# Patient Record
Sex: FEMALE | Race: WHITE | Employment: OTHER | URBAN - METROPOLITAN AREA
[De-identification: names, ages, dates, MRNs, and addresses within clinical notes are randomized per-mention and may not be internally consistent; named-entity substitution may affect disease eponyms.]

---

## 2017-05-17 PROBLEM — H43.819 PVD (POSTERIOR VITREOUS DETACHMENT): Status: ACTIVE | Noted: 2017-05-17

## 2017-05-17 PROBLEM — H25.813 COMBINED FORMS OF AGE-RELATED CATARACT OF BOTH EYES: Status: ACTIVE | Noted: 2017-05-17

## 2017-05-17 PROBLEM — G43.109 OCULAR MIGRAINE: Status: ACTIVE | Noted: 2017-05-17

## 2017-05-18 PROCEDURE — 82607 VITAMIN B-12: CPT | Performed by: NURSE PRACTITIONER

## 2017-05-22 RX ORDER — MULTIVITAMIN WITH IRON
250 TABLET ORAL DAILY
COMMUNITY
End: 2018-03-30

## 2017-06-07 ENCOUNTER — HOSPITAL ENCOUNTER (OUTPATIENT)
Facility: HOSPITAL | Age: 72
Setting detail: HOSPITAL OUTPATIENT SURGERY
Discharge: HOME | End: 2017-06-07
Attending: INTERNAL MEDICINE | Admitting: INTERNAL MEDICINE
Payer: MEDICARE

## 2017-06-07 ENCOUNTER — SURGERY (OUTPATIENT)
Age: 72
End: 2017-06-07

## 2017-06-07 VITALS
HEART RATE: 88 BPM | BODY MASS INDEX: 45.96 KG/M2 | SYSTOLIC BLOOD PRESSURE: 112 MMHG | HEIGHT: 66 IN | TEMPERATURE: 98 F | DIASTOLIC BLOOD PRESSURE: 76 MMHG | RESPIRATION RATE: 16 BRPM | WEIGHT: 286 LBS | OXYGEN SATURATION: 93 %

## 2017-06-07 DIAGNOSIS — Z12.11 SPECIAL SCREENING FOR MALIGNANT NEOPLASMS, COLON: ICD-10-CM

## 2017-06-07 PROCEDURE — 99152 MOD SED SAME PHYS/QHP 5/>YRS: CPT

## 2017-06-07 PROCEDURE — 88305 TISSUE EXAM BY PATHOLOGIST: CPT | Performed by: INTERNAL MEDICINE

## 2017-06-07 PROCEDURE — 0DBH8ZX EXCISION OF CECUM, VIA NATURAL OR ARTIFICIAL OPENING ENDOSCOPIC, DIAGNOSTIC: ICD-10-PCS | Performed by: INTERNAL MEDICINE

## 2017-06-07 RX ORDER — SODIUM CHLORIDE, SODIUM LACTATE, POTASSIUM CHLORIDE, CALCIUM CHLORIDE 600; 310; 30; 20 MG/100ML; MG/100ML; MG/100ML; MG/100ML
INJECTION, SOLUTION INTRAVENOUS CONTINUOUS
Status: DISCONTINUED | OUTPATIENT
Start: 2017-06-07 | End: 2017-06-07

## 2017-06-07 RX ORDER — MIDAZOLAM HYDROCHLORIDE 1 MG/ML
INJECTION INTRAMUSCULAR; INTRAVENOUS
Status: DISCONTINUED | OUTPATIENT
Start: 2017-06-07 | End: 2017-06-07

## 2017-06-07 NOTE — BRIEF OP NOTE
Pre-Operative Diagnosis: Special screening for malignant neoplasms, colon [Z12.11]     Post-Operative Diagnosis: diverticulosis, polyp, hemorrhoids     Procedure Performed:   Procedure(s):  colonoscopy with BX    Surgeon(s) and Role:     * Iveth Francis

## 2017-06-07 NOTE — OPERATIVE REPORT
[facility]    PATIENT'S NAME: Marnie Genao   ATTENDING PHYSICIAN: Jaciel Cárdenas M.D. OPERATING PHYSICIAN: Jaciel Cárdenas M.D.    PATIENT ACCOUNT#:   [de-identified]    LOCATION:  [facility] ENDO POOL ROOMS [address] [facility] #:

## 2017-06-07 NOTE — H&P
[facility] [address] [facility] Department of  Gastroenterology  Update Health History :        [facility]  female   Lorenzo Alston MD     [id number]  5/20/1945 Primary Care Physician  Isi Mckinnon MD Comment Procedure: REMOVAL OF ASSESSORY BONE  HALLUX;  Surgeon: Xavier Perez DPM;  Location: [address] [place] [address]    PATIENT DOCUMENTED NOT TO HAVE EXPERIENCED ANY OF THE FOLLOWING EVENTS  11/14/2013    Comment Procedure: REMOVAL OF ASSESSORY B Disp: 60 g Rfl: 3   Psyllium (EQ FIBER THERAPY) 48.57 % Oral Powder Take by mouth. 2 TBS daily  Disp:  Rfl:    Fluticasone Propionate (FLONASE) 50 MCG/ACT Nasal Suspension 1 spray by Nasal route daily.  (Patient taking differently: 1 spray by Nasal route da discussed, all questions were answered, patient verbalized understanding and agreed to proceed with procedure as scheduled.       Amie Freeman MD

## 2017-06-09 NOTE — PROGRESS NOTES
Quick Note:    6/9/2017  [address] [id number]    Dear [facility],       Here are the biopsy/pathology findings from your recent Colonoscopy :    an adenomatous polyp(s), which is a benign potentially pre-cancerous growth

## 2017-07-24 PROBLEM — H81.11 BPPV (BENIGN PAROXYSMAL POSITIONAL VERTIGO), RIGHT: Status: ACTIVE | Noted: 2017-07-24

## 2017-09-15 PROBLEM — R79.89 ELEVATED HOMOCYSTEINE: Status: ACTIVE | Noted: 2017-09-15

## 2017-09-15 PROBLEM — R94.01 ELECTROENCEPHALOGRAM (EEG) ABNORMALITY WITHOUT SEIZURE: Status: ACTIVE | Noted: 2017-09-15

## 2017-10-25 PROBLEM — M65.312 TRIGGER THUMB OF LEFT HAND: Status: ACTIVE | Noted: 2017-10-25

## 2017-12-13 PROBLEM — Z48.89 AFTERCARE FOLLOWING SURGERY: Status: ACTIVE | Noted: 2017-12-13

## 2018-02-08 PROCEDURE — 87077 CULTURE AEROBIC IDENTIFY: CPT | Performed by: EMERGENCY MEDICINE

## 2018-02-08 PROCEDURE — 87075 CULTR BACTERIA EXCEPT BLOOD: CPT | Performed by: EMERGENCY MEDICINE

## 2018-02-08 PROCEDURE — 87186 SC STD MICRODIL/AGAR DIL: CPT | Performed by: EMERGENCY MEDICINE

## 2018-02-08 PROCEDURE — 87205 SMEAR GRAM STAIN: CPT | Performed by: EMERGENCY MEDICINE

## 2018-02-08 PROCEDURE — 87070 CULTURE OTHR SPECIMN AEROBIC: CPT | Performed by: EMERGENCY MEDICINE

## 2018-03-07 PROBLEM — E78.00 PURE HYPERCHOLESTEROLEMIA: Status: ACTIVE | Noted: 2018-03-07

## 2018-03-07 PROBLEM — M65.312 TRIGGER THUMB OF LEFT HAND: Status: RESOLVED | Noted: 2017-10-25 | Resolved: 2018-03-07

## 2018-03-07 PROBLEM — Z48.89 AFTERCARE FOLLOWING SURGERY: Status: RESOLVED | Noted: 2017-12-13 | Resolved: 2018-03-07

## 2018-03-13 PROBLEM — R26.89 BALANCE PROBLEM: Status: ACTIVE | Noted: 2018-03-13

## 2018-05-25 PROBLEM — I87.2 EDEMA OF BOTH LOWER EXTREMITIES DUE TO PERIPHERAL VENOUS INSUFFICIENCY: Status: ACTIVE | Noted: 2018-05-25

## 2018-05-25 PROBLEM — I83.893 VARICOSE VEINS OF BOTH LEGS WITH EDEMA: Status: ACTIVE | Noted: 2018-05-25

## 2018-06-20 PROBLEM — D75.839 THROMBOCYTOSIS: Status: ACTIVE | Noted: 2018-06-20

## 2018-06-20 PROBLEM — I70.0 AORTIC ATHEROSCLEROSIS (HCC): Status: ACTIVE | Noted: 2018-06-20

## 2018-06-20 PROBLEM — I50.32 CHRONIC DIASTOLIC CHF (CONGESTIVE HEART FAILURE) (HCC): Status: ACTIVE | Noted: 2018-06-20

## 2018-06-20 PROBLEM — I70.0 AORTIC ATHEROSCLEROSIS: Status: ACTIVE | Noted: 2018-06-20

## 2018-06-20 PROBLEM — R56.9 SEIZURE (HCC): Status: ACTIVE | Noted: 2018-06-20

## 2019-05-03 PROBLEM — R94.01 ELECTROENCEPHALOGRAM (EEG) ABNORMALITY WITHOUT SEIZURE: Status: RESOLVED | Noted: 2017-09-15 | Resolved: 2019-05-03

## 2019-05-03 PROBLEM — R56.9 SEIZURE (HCC): Status: RESOLVED | Noted: 2018-06-20 | Resolved: 2019-05-03

## 2019-05-03 PROBLEM — R26.89 BALANCE PROBLEM: Status: RESOLVED | Noted: 2018-03-13 | Resolved: 2019-05-03

## 2019-05-20 PROBLEM — R79.89 ELEVATED HOMOCYSTEINE: Status: RESOLVED | Noted: 2017-09-15 | Resolved: 2019-05-20

## 2019-08-05 PROCEDURE — 87081 CULTURE SCREEN ONLY: CPT | Performed by: PHYSICIAN ASSISTANT

## 2019-09-10 PROBLEM — R25.2 JERKING MOVEMENTS OF EXTREMITIES: Status: ACTIVE | Noted: 2019-09-10

## 2019-09-10 PROBLEM — R94.01 ABNORMAL EEG: Status: ACTIVE | Noted: 2019-09-10

## 2020-01-16 PROBLEM — J44.9 CHRONIC OBSTRUCTIVE PULMONARY DISEASE, UNSPECIFIED COPD TYPE (HCC): Status: ACTIVE | Noted: 2020-01-16

## 2020-01-16 PROBLEM — E72.11 HOMOCYSTINURIA (HCC): Status: ACTIVE | Noted: 2020-01-16

## 2020-09-14 ENCOUNTER — APPOINTMENT (OUTPATIENT)
Dept: LAB | Facility: HOSPITAL | Age: 75
End: 2020-09-14
Attending: SURGERY
Payer: MEDICARE

## 2020-09-14 DIAGNOSIS — I82.512 CHRONIC EMBOLISM AND THROMBOSIS OF LEFT FEMORAL VEIN (HCC): ICD-10-CM

## 2020-09-15 RX ORDER — GABAPENTIN 100 MG/1
200 CAPSULE ORAL 3 TIMES DAILY
COMMUNITY
End: 2020-10-14 | Stop reason: ALTCHOICE

## 2020-09-15 RX ORDER — PREDNISONE 50 MG/1
50 TABLET ORAL SEE ADMIN INSTRUCTIONS
COMMUNITY
End: 2020-09-24

## 2020-09-15 RX ORDER — DIPHENHYDRAMINE HCL 50 MG
50 CAPSULE ORAL ONCE
COMMUNITY
End: 2020-09-24

## 2020-09-15 NOTE — PAT NURSING NOTE
PAT note:  Pt is scheduled for a venogram on 9/17/20. Called in Prednisone to be taken prior. [address] Spoke with Carmelo Rivera pharmacist.  Take prednisone 50 mgs PO 13 hrs prior, 7 hours prior, 1 hour prior.   Take Benadryl 50 m

## 2020-09-17 ENCOUNTER — HOSPITAL ENCOUNTER (OUTPATIENT)
Dept: INTERVENTIONAL RADIOLOGY/VASCULAR | Facility: HOSPITAL | Age: 75
Discharge: HOME | End: 2020-09-17
Attending: SURGERY | Admitting: SURGERY
Payer: MEDICARE

## 2020-09-17 VITALS
HEIGHT: 66 IN | TEMPERATURE: 99 F | SYSTOLIC BLOOD PRESSURE: 121 MMHG | OXYGEN SATURATION: 92 % | RESPIRATION RATE: 20 BRPM | WEIGHT: 261 LBS | BODY MASS INDEX: 41.95 KG/M2 | DIASTOLIC BLOOD PRESSURE: 67 MMHG | HEART RATE: 105 BPM

## 2020-09-17 DIAGNOSIS — I82.512 CHRONIC DEEP VEIN THROMBOSIS (DVT) OF FEMORAL VEIN OF LEFT LOWER EXTREMITY (HCC): ICD-10-CM

## 2020-09-17 DIAGNOSIS — I82.512 CHRONIC EMBOLISM AND THROMBOSIS OF LEFT FEMORAL VEIN (HCC): Primary | ICD-10-CM

## 2020-09-17 LAB — SARS-COV-2 RNA RESP QL NAA+PROBE: NOT DETECTED

## 2020-09-17 PROCEDURE — 37252 INTRVASC US NONCORONARY 1ST: CPT

## 2020-09-17 PROCEDURE — B54CZZ3 ULTRASONOGRAPHY OF LEFT LOWER EXTREMITY VEINS, INTRAVASCULAR: ICD-10-PCS | Performed by: SURGERY

## 2020-09-17 PROCEDURE — 76937 US GUIDE VASCULAR ACCESS: CPT

## 2020-09-17 PROCEDURE — B549ZZ3 ULTRASONOGRAPHY OF INFERIOR VENA CAVA, INTRAVASCULAR: ICD-10-PCS | Performed by: SURGERY

## 2020-09-17 PROCEDURE — 36005 INJECTION EXT VENOGRAPHY: CPT

## 2020-09-17 PROCEDURE — 37253 INTRVASC US NONCORONARY ADDL: CPT

## 2020-09-17 PROCEDURE — 99152 MOD SED SAME PHYS/QHP 5/>YRS: CPT

## 2020-09-17 PROCEDURE — 75820 VEIN X-RAY ARM/LEG: CPT

## 2020-09-17 PROCEDURE — B51CYZZ FLUOROSCOPY OF LEFT LOWER EXTREMITY VEINS USING OTHER CONTRAST: ICD-10-PCS | Performed by: SURGERY

## 2020-09-17 RX ORDER — HEPARIN SODIUM 5000 [USP'U]/ML
INJECTION, SOLUTION INTRAVENOUS; SUBCUTANEOUS
Status: COMPLETED
Start: 2020-09-17 | End: 2020-09-17

## 2020-09-17 RX ORDER — MIDAZOLAM HYDROCHLORIDE 1 MG/ML
INJECTION INTRAMUSCULAR; INTRAVENOUS
Status: COMPLETED
Start: 2020-09-17 | End: 2020-09-17

## 2020-09-17 RX ORDER — LIDOCAINE HYDROCHLORIDE 10 MG/ML
INJECTION, SOLUTION EPIDURAL; INFILTRATION; INTRACAUDAL; PERINEURAL
Status: COMPLETED
Start: 2020-09-17 | End: 2020-09-17

## 2020-09-17 RX ORDER — SODIUM CHLORIDE 9 MG/ML
INJECTION, SOLUTION INTRAVENOUS CONTINUOUS
Status: DISCONTINUED | OUTPATIENT
Start: 2020-09-17 | End: 2020-09-17

## 2020-09-17 RX ADMIN — SODIUM CHLORIDE: 9 INJECTION, SOLUTION INTRAVENOUS at 12:00:00

## 2020-09-17 NOTE — PROGRESS NOTES
Patient had LLE venogram with Dr. Vanesa Wolff today. Left upper thigh dressing in place, CDI, soft. Patient denies any pain. VSS.  is at bedside. Dr. Vanesa Wolff @ bedside post procedure. Patient completed recovery time. Patient tolerating po intake.  Patient

## 2020-09-17 NOTE — BRIEF OP NOTE
Pre-Operative Diagnosis: Left leg swelling     Post-Operative Diagnosis: same     Procedure Performed:   1. US perc access left femoral vein  2. Venogram   3.  IVUS     Anesthesia 4 V , 75 F, start 1409 fin 1429, total 20    Assistant(s):        Surgical Fi

## 2020-09-23 PROBLEM — G31.9 CEREBELLAR ATROPHY: Status: ACTIVE | Noted: 2020-09-23

## 2020-09-23 PROBLEM — G31.9 CEREBELLAR ATROPHY (HCC): Status: ACTIVE | Noted: 2020-09-23

## 2020-09-27 NOTE — PROCEDURES
[address]    PATIENT'S NAME: Mike Wheeler   ATTENDING PHYSICIAN: Christel Pinto M.D. OPERATING PHYSICIAN: Christel Pinto M.D.    PATIENT ACCOUNT#:   [de-identified]    LOCATION:  [address]  MEDICAL RECORD #:   [id number] position and prepped and draped in sterile fashion. Using ultrasound, I percutaneously accessed the femoral vein in the proximal thigh with a micropuncture kit.   I then advanced a J-wire into the vena cava and exchanged the micropuncture sheath for a 5-Fr

## 2020-10-29 PROBLEM — J42 CHRONIC BRONCHITIS (HCC): Status: ACTIVE | Noted: 2020-10-29

## 2020-10-29 PROBLEM — N18.31 STAGE 3A CHRONIC KIDNEY DISEASE (HCC): Status: ACTIVE | Noted: 2020-10-29

## 2020-10-29 PROBLEM — N18.9 CKD (CHRONIC KIDNEY DISEASE): Status: ACTIVE | Noted: 2020-10-29

## 2020-10-29 PROBLEM — I82.512 CHRONIC DEEP VEIN THROMBOSIS (DVT) OF FEMORAL VEIN OF LEFT LOWER EXTREMITY (HCC): Status: ACTIVE | Noted: 2020-10-29

## 2020-10-29 PROBLEM — J43.9 EMPHYSEMA OF LUNG (HCC): Status: ACTIVE | Noted: 2020-10-29

## 2021-01-21 PROBLEM — I26.99 OTHER PULMONARY EMBOLISM WITHOUT ACUTE COR PULMONALE, UNSPECIFIED CHRONICITY (HCC): Status: ACTIVE | Noted: 2021-01-21

## 2021-01-21 PROBLEM — J43.9 EMPHYSEMA OF LUNG (HCC): Status: RESOLVED | Noted: 2020-10-29 | Resolved: 2021-01-21

## 2021-04-20 PROCEDURE — 88305 TISSUE EXAM BY PATHOLOGIST: CPT | Performed by: INTERNAL MEDICINE

## 2021-04-29 PROBLEM — J43.9 MILD EMPHYSEMA (HCC): Status: ACTIVE | Noted: 2021-04-29

## 2022-02-01 PROBLEM — E11.36 TYPE 2 DIABETES MELLITUS WITH CATARACT (HCC): Status: ACTIVE | Noted: 2022-02-01

## 2022-02-01 PROBLEM — N18.30 TYPE 2 DIABETES MELLITUS WITH STAGE 3 CHRONIC KIDNEY DISEASE, WITHOUT LONG-TERM CURRENT USE OF INSULIN (HCC): Status: ACTIVE | Noted: 2022-02-01

## 2022-02-01 PROBLEM — E11.22 TYPE 2 DIABETES MELLITUS WITH STAGE 3 CHRONIC KIDNEY DISEASE, WITHOUT LONG-TERM CURRENT USE OF INSULIN (HCC): Status: ACTIVE | Noted: 2022-02-01

## 2022-02-01 PROBLEM — Z99.81 CHRONIC RESPIRATORY FAILURE WITH HYPOXIA, ON HOME OXYGEN THERAPY (HCC): Status: ACTIVE | Noted: 2022-02-01

## 2022-02-01 PROBLEM — J96.11 CHRONIC RESPIRATORY FAILURE WITH HYPOXIA, ON HOME OXYGEN THERAPY (HCC): Status: ACTIVE | Noted: 2022-02-01

## 2022-02-02 ENCOUNTER — TELEPHONE (OUTPATIENT)
Dept: MEDSURG UNIT | Facility: HOSPITAL | Age: 77
End: 2022-02-02

## 2022-02-15 PROBLEM — E72.11 HOMOCYSTINURIA (HCC): Status: RESOLVED | Noted: 2020-01-16 | Resolved: 2022-02-15

## 2025-05-08 ENCOUNTER — OFFICE VISIT (OUTPATIENT)
Dept: WOUND CARE | Facility: HOSPITAL | Age: 80
End: 2025-05-08
Attending: NURSE PRACTITIONER
Payer: MEDICARE

## 2025-05-08 VITALS
OXYGEN SATURATION: 92 % | HEART RATE: 92 BPM | RESPIRATION RATE: 18 BRPM | DIASTOLIC BLOOD PRESSURE: 70 MMHG | TEMPERATURE: 98 F | SYSTOLIC BLOOD PRESSURE: 133 MMHG

## 2025-05-08 DIAGNOSIS — C44.729 SQUAMOUS CELL CARCINOMA OF LEFT LOWER LEG: ICD-10-CM

## 2025-05-08 DIAGNOSIS — T81.89XA SURGICAL WOUND, NON HEALING, INITIAL ENCOUNTER: ICD-10-CM

## 2025-05-08 DIAGNOSIS — I83.892 VARICOSE VEINS OF LEFT LEG WITH EDEMA: ICD-10-CM

## 2025-05-08 DIAGNOSIS — S81.802S NON-HEALING WOUND OF LOWER EXTREMITY, LEFT, SEQUELA: Primary | ICD-10-CM

## 2025-05-08 PROCEDURE — 97597 DBRDMT OPN WND 1ST 20 CM/<: CPT | Performed by: NURSE PRACTITIONER

## 2025-05-08 PROCEDURE — 99205 OFFICE O/P NEW HI 60 MIN: CPT

## 2025-05-08 NOTE — PROGRESS NOTES
Weekly Wound Education Note    Teaching Provided To: Patient  Training Topics: Cleasing and general instructions, Compression, Skin care, Signs and symptoms of infection, Safe and effective use of medical equipement and /or supplies, Purpose and directions to contact wound care physician or PCP, Dressing, Edema control  Training Method: Demonstration, Explain/Verbal  Training Response: Patient responds and understands        Notes: Left Lower Leg: Medihoney, gauze, kerlix/ kerramax, zinc oxide to periwound. 2 x Medigrip size F

## 2025-05-08 NOTE — PROGRESS NOTES
Chief Complaint   Patient presents with    Post-Op     Has had the wound since March post surgery for cancer removal, has been keeping it covered and putting vaseline on it. Wears knee high compression for both at home. Had the edema in her left leg since her knee replacement in 2015       HPI:   5/8/25: 79 yr old female presents to wound clinic for evaluation of wound on her LLE. She is s/p ED&C of SCC of the left lower shin 3/6/25.  The excision site has been being treated for infection by dermatology. She was prescribed doxy, clinda, then cipro 500 mg BID (mid-April). Wound culture 4/3/25 grew Proteus mirabilis, E. Coli and staph species. No growth on wound cx 4/24/25. Pt reports the wound is no longer tender. She applies vaseline to the wound daily. She has a hx of venous insufficiency and wears compression stockings on and off. Other medical problems include T2DM (A1c 7.0% 4/23/25), HTN, HLD, DVT on xarelto.     Medications - Current[1]    Allergies[2]     Problem List[3]    Past Medical History[4]      REVIEW OF SYSTEMS:   Review of Systems   Constitutional:  Negative for activity change, appetite change, chills, fatigue and fever.   Respiratory:  Negative for shortness of breath.    Cardiovascular:  Positive for leg swelling. Negative for chest pain.   Gastrointestinal:  Negative for abdominal pain.   Musculoskeletal:  Negative for gait problem.   Skin:  Positive for wound. Negative for color change and rash.          PHYSICAL EXAM:   /70 (BP Location: Right arm, Patient Position: Sitting, Cuff Size: large)   Pulse 92   Temp 98 °F (36.7 °C)   Resp 18   SpO2 92%    Estimated body mass index is 39.54 kg/m² as calculated from the following:    Height as of 3/22/22: 66\".    Weight as of 3/22/22: 245 lb.   Vital signs reviewed.Appears stated age, well groomed.    Physical Exam  Constitutional:       General: She is not in acute distress.     Appearance: Normal appearance. She is obese. She is not  ill-appearing.   HENT:      Head: Normocephalic and atraumatic.   Cardiovascular:      Rate and Rhythm: Normal rate.      Pulses:           Dorsalis pedis pulses are 2+ on the left side.        Posterior tibial pulses are 2+ on the left side.   Pulmonary:      Effort: Pulmonary effort is normal.   Musculoskeletal:      Left lower leg: Edema present.   Feet:      Comments: 5/8/25: Left PT/DP pulses w/biphasic wave sounds using handheld doppler  Skin:     General: Skin is warm and dry.      Capillary Refill: Capillary refill takes 2 to 3 seconds.      Findings: Wound (LLE, see wound care flowsheet) present.   Neurological:      General: No focal deficit present.      Mental Status: She is alert and oriented to person, place, and time.   Psychiatric:         Mood and Affect: Mood normal.         Behavior: Behavior normal.          Wound 05/08/25 1 Leg Left;Anterior;Lower;Medial (Active)   Date First Assessed/Time First Assessed: 05/08/25 1318   Present on Original Admission: Yes   Wound Number (Wound Clinic Only): 1  Primary Wound Type: Old surgical  Location: Leg  Wound Location Orientation: Left;Anterior;Lower;Medial      Assessments 5/8/2025  1:22 PM   Wound Image      Site Assessment Yellow;Red;Moist   Closure Not approximated   Drainage Amount Moderate   Drainage Description Serosanguineous   Treatments Cleansed;Saline;Vashe;Topical (Barrier/Moisturizer/Ointment);Wound ;Honey Gel (zinc oxide to periwound)   Dressing Gauze;Kerlix roll;Tape (kerramax)   Dressing Changed New   Dressing Status Clean;Dry;Intact   Wound Length (cm) 1.7 cm   Wound Width (cm) 1.8 cm   Wound Surface Area (cm^2) 2.4 cm^2   Wound Depth (cm) 0.1 cm   Wound Volume (cm^3) 0.16 cm^3   Margins Well-defined edges;Attached edges   Non-staged Wound Description Full thickness   Ira-wound Assessment Dry;Intact;Pink   Wound Granulation Tissue Red   Wound Bed Granulation (%) 10 %   Wound Bed Epithelium (%) 0 %   Wound Bed Slough (%) 90 %   Wound  Bed Eschar (%) 0 %   Wound Bed Fibrin (%) 0 %   State of Healing Non-healing   Wound Odor None       Active Orders   Date Order Priority Status Authorizing Provider   05/08/25 1615 OP Wound Dressing Routine Active Negra Genao APRN     - Cleansing:    Cleanse with normal saline or wound cleanser     - Cleansing:    Cleanse with Vashe     - Dressing:    Honey gel     - Dressing:    Kerramax/Super absorbent     - Dressing:    Kerlix     - Dressing:    Medipore tape (no substitution)     - Dressing:    Dry gauze     - Dressing:    Sensi-care/zinc     - Frequency:    Change dressing three times per week (and PRN)       Inactive Orders   Date Order Priority Status Authorizing Provider   05/08/25 1358 Debridement Old surgical Left;Anterior;Lower;Medial Leg Routine Completed Negra Genao APRN       Compression Wrap 05/08/25 Leg Left (Active)   Placement Date/Time: 05/08/25 1342   Location: Leg  Wound Location Orientation: Left      Assessments 5/8/2025  1:22 PM   Response to Treatment Well tolerated   Compression Layers 2   Compression Product Type Tubigrip/Spanda (Size F x 2 medigrip)   Dressing Applied Yes   Compression Wrap Location Toes to Knee   Compression Wrap Status Clean;Dry;Intact       Active Orders   Date Order Priority Status Authorizing Provider   05/08/25 1615 OP Wound Dressing Routine Active Negra Genao APRN     - Cleansing:    Cleanse with normal saline or wound cleanser     - Cleansing:    Cleanse with Vashe     - Dressing:    Honey gel     - Dressing:    Kerramax/Super absorbent     - Dressing:    Kerlix     - Dressing:    Medipore tape (no substitution)     - Dressing:    Dry gauze     - Dressing:    Sensi-care/zinc     - Frequency:    Change dressing three times per week (and PRN)     Lower Extremity Measurements   Calf Ankle Foot Heel to Knee Knee to Thigh   Right                                Left Left Calf from:: Heel  Calf Left cm:: 42 Left Ankle from:: Heel  Left Ankle cm:: 29.5     Left Foot from:: Great toe  Left Foot cm:: 22 Left Heel to Knee: 44          ASSESSMENT AND PLAN:      1. Non-healing wound of lower extremity, left, sequela  - OP Wound Dressing; Standing    2. Surgical wound, non healing, initial encounter    3. 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 Varicose veins of left leg with edema    Here for evaluation of non-healing surgical wound on LLE, s/p ED&C for removal of SCC 3/2025. Wound was previously treated with 3 rounds of antibiotics for wound infection. No infection present on exam. Hindering factors for wound healing include HTN, venous insufficiency w/edema, T2DM.     Wound is dry with yellow slough, small granulation  Periwound clean and dry  LE edema  L DP & PT pulses palpable, biphasic wave sounds    Wound Care:  -Selective debridement (see proc note)  -Cleansed w/Vashe  -Medi-honey and gauze dressing  -Change every 1-2 days and PRN  -Avoid getting wound wet in shower, wear cast cover  -Medi- F, double layer applied for edema management (has 20-30 mmHg stockings at home, okay to wear instead)    Reviewed importance of the following:  -Nutrition for wound healing: increase protein intake, lower carb and sugar intake, need vit A, B's, C, D and zinc for wound healing along with healthy diet  -Minimize sodium intake and take medication for HTN as prescribed  -Elevate legs above heart level for 30 minutes 3x/day to reduce swelling  -Calf exercises throughout the day, stay active, but avoid standing for prolonged periods of time  -Monitor for s/sx of infection by checking skin daily    Risks, benefits, and alternatives of current treatment plan discussed in detail.  Questions and concerns addressed. Red flags to RTC or ED reviewed.  Patient agrees to plan.      Return in about 2 weeks (around 5/22/2025) for NIKKI Omer x 30 minutes.      I spent 30 minutes with the patient. This time included:  preparing to see the patient (eg, review notes and recent  diagnostics), seeing the patient, performing a medically appropriate examination and/or evaluation, counseling and educating the patient, and documenting in the record.    NOTE TO PATIENT: The 21st Century Cures Act makes clinical notes like these available to patients in the interest of transparency. Clinical notes are medical documents used by physicians and care providers to communicate with each other. These documents include medical language and terminology, abbreviations, and treatment information that may sound technical and at times possibly unfamiliar. In addition, at times, the verbiage may appear blunt or direct. These documents are one tool providers use to communicate relevant information and clinical opinions of the care providers in a way that allows common understanding of the clinical context.          [1]   Current Outpatient Medications:     lisinopril-hydroCHLOROthiazide 20-25 MG Oral Tab, Take 1 tablet by mouth daily., Disp: 90 tablet, Rfl: 0    XARELTO 20 MG Oral Tab, TAKE 1 TABLET(20 MG) BY MOUTH DAILY WITH FOOD, Disp: 90 tablet, Rfl: 1    ROSUVASTATIN 10 MG Oral Tab, TAKE 1 TABLET(10 MG) BY MOUTH EVERY NIGHT, Disp: 90 tablet, Rfl: 0    Misc. Devices (PULSE OXIMETER FOR FINGER) Does not apply Misc, 1 each every 4 (four) hours as needed., Disp: 1 each, Rfl: 0    GEMFIBROZIL 600 MG Oral Tab, TAKE 1 TABLET(600 MG) BY MOUTH TWICE DAILY BEFORE MEALS, Disp: 180 tablet, Rfl: 1    gabapentin 100 MG Oral Cap, TAKE 1 CAPSULE BY MOUTH EVERY MORNING AND 2 CAPSULES EVERY EVENING, Disp: 270 capsule, Rfl: 3    Glucose Blood (ACCU-CHEK GUIDE) In Vitro Strip, 1 strip by In Vitro route daily., Disp: 100 strip, Rfl: 3    Accu-Chek Softclix Lancets Does not apply Misc, Check blood glucose levels daily, Disp: 100 each, Rfl: 3    Vitamin D3, Cholecalciferol, 25 MCG (1000 UT) Oral Tab, Take 2,000 Units by mouth daily., Disp: , Rfl:     Budesonide-Formoterol Fumarate (SYMBICORT) 160-4.5 MCG/ACT Inhalation Aerosol,  Inhale 2 puffs into the lungs 2 (two) times daily., Disp: 3 Inhaler, Rfl: 3    Albuterol Sulfate  (90 Base) MCG/ACT Inhalation Aero Soln, Inhale 2 puffs into the lungs every 4 (four) hours as needed., Disp: 1 Inhaler, Rfl: 3    Folic Acid-Vit B6-Vit B12 (FOLBEE) 2.5-25-1 MG Oral Tab, Take 1 tablet by mouth daily., Disp: 90 tablet, Rfl: 3    metRONIDAZOLE 0.75 % External Cream, Apply to AA on face daily, Disp: 45 g, Rfl: 2    Fluticasone Propionate 50 MCG/ACT Nasal Suspension, 2 sprays by Each Nare route daily. (Patient taking differently: 2 sprays by Each Nare route daily as needed.  ), Disp: 3 Bottle, Rfl: 3    Multiple Vitamins-Minerals (MULTIVITAL) Oral Tab, Take 1 tablet by mouth daily., Disp: , Rfl:     Psyllium 48.57 % Oral Powder, Take by mouth. 2 TBS daily , Disp: , Rfl:     fexofenadine 180 MG Oral Tab, Take 180 mg by mouth daily as needed.  , Disp: , Rfl:   [2]   Allergies  Allergen Reactions    Other HIVES     Atkins brand probiotics  Itching with Dial Soap (Bar soap)     Shellfish HIVES    Aspirin OTHER (SEE COMMENTS)     Stomach pain-the 325mg    Cardizem [Diltiazem Hcl]      Doesn't remember    Ceftin      Doesn't remember    Guaifed [Pseudoephedrine-Guaifenesin]      Doesn't remember    Adhesive Tape OTHER (SEE COMMENTS)     Some bandaids only redness   [3]   Patient Active Problem List  Diagnosis    HTN (hypertension)    RENATE on CPAP    Toxoplasmosis chorioretinitis of right eye    Morbid obesity (HCC)    Combined forms of age-related cataract of both eyes    PVD (posterior vitreous detachment)    Ocular migraine    BPPV (benign paroxysmal positional vertigo), right    Pure hypercholesterolemia    Varicose veins of both legs with edema    Edema of both lower extremities due to peripheral venous insufficiency    Thrombocytosis    Aortic atherosclerosis    Chronic diastolic CHF (congestive heart failure) (HCC)    Abnormal EEG    Jerking movements of extremities    Chronic obstructive pulmonary  disease, unspecified COPD type (HCC)    BMI 40.0-44.9, adult (HCC)    Cerebellar atrophy    Stage 3a chronic kidney disease (HCC)    Chronic deep vein thrombosis (DVT) of femoral vein of left lower extremity (HCC)    Chronic bronchitis (HCC)    Other pulmonary embolism without acute cor pulmonale, unspecified chronicity (HCC)    Mild emphysema (HCC)    Type 2 diabetes mellitus with cataract (HCC)    Type 2 diabetes mellitus with stage 3 chronic kidney disease, without long-term current use of insulin (HCC)    Chronic respiratory failure with hypoxia, on home oxygen therapy (HCC)   [4]   Past Medical History:   CKD (chronic kidney disease)    COPD    mild, no O2 at this time    Deep vein thrombosis (HCC)    left after knee scope - treated and resolved    DM2 (diabetes mellitus, type 2) (HCC)    DM2 (diabetes mellitus, type 2) (HCC)    Glucose intolerance (pre-diabetes)    Hypercholesterolemia    Hypertension    MENOPAUSE    OBESITY    Osteoarthritis    SEASONAL ALLERGIES    Skin cancer of face    basal cell    Sleep apnea    AHI 4/ RDI 29/ SaO2 84%/ CPAP 8 with oxygen at 2 l/min entrained/ [facility]    Superficial thrombophlebitis    left thigh    Tendonitis of wrist, left    Transient ischemic attack (TIA)    possible TIA    Venous insufficiency

## 2025-05-20 NOTE — PROGRESS NOTES
Chief Complaint   Patient presents with    Wound Care     Patient here for follow up of left lower leg wound       HPI:   5/21/25: F/u visit LLE wound. Says she has been applying medi-honey and gauze to the wound for the past 2 weeks along with wearing a double layer of Medi- that she was given. She has been washing the wound in the shower and leaving open to air after showering. Says the wound has increased pain for at least an hour after showering.     5/8/25: 79 yr old female presents to wound clinic for evaluation of wound on her LLE. She is s/p ED&C of SCC of the left lower shin 3/6/25.  The excision site has been being treated for infection by dermatology. She was prescribed doxy, clinda, then cipro 500 mg BID (mid-April). Wound culture 4/3/25 grew Proteus mirabilis, E. Coli and staph species. No growth on wound cx 4/24/25. Pt reports the wound is no longer tender. She applies vaseline to the wound daily. She has a hx of venous insufficiency and wears compression stockings on and off. Other medical problems include T2DM (A1c 7.0% 4/23/25), HTN, HLD, DVT on xarelto.     Medications - Current[1]    Allergies[2]     Problem List[3]    Past Medical History[4]      REVIEW OF SYSTEMS:   Review of Systems   Constitutional:  Negative for activity change, appetite change, chills, fatigue and fever.   Respiratory:  Negative for shortness of breath.    Cardiovascular:  Positive for leg swelling. Negative for chest pain.   Gastrointestinal:  Negative for abdominal pain.   Musculoskeletal:  Negative for gait problem.   Skin:  Positive for wound. Negative for color change and rash.      PHYSICAL EXAM:   /79   Pulse 90   Temp 98.2 °F (36.8 °C) (Oral)   Resp 18   SpO2 94%    Estimated body mass index is 39.54 kg/m² as calculated from the following:    Height as of 3/22/22: 66\".    Weight as of 3/22/22: 245 lb.   Vital signs reviewed.Appears stated age, well groomed.    Physical Exam  Constitutional:        General: She is not in acute distress.     Appearance: Normal appearance. She is obese. She is not ill-appearing.   HENT:      Head: Normocephalic and atraumatic.   Cardiovascular:      Rate and Rhythm: Normal rate.      Pulses:           Dorsalis pedis pulses are 2+ on the left side.        Posterior tibial pulses are 2+ on the left side.   Pulmonary:      Effort: Pulmonary effort is normal.   Musculoskeletal:      Left lower leg: Edema present.   Feet:      Comments: 5/8/25: Left PT/DP pulses w/biphasic wave sounds using handheld doppler  Skin:     General: Skin is warm and dry.      Capillary Refill: Capillary refill takes 2 to 3 seconds.      Findings: Wound (LLE, see wound care flowsheet) present.   Neurological:      General: No focal deficit present.      Mental Status: She is alert and oriented to person, place, and time.   Psychiatric:         Mood and Affect: Mood normal.         Behavior: Behavior normal.          Wound 05/08/25 1 Leg Left;Anterior;Lower;Medial (Active)   Date First Assessed/Time First Assessed: 05/08/25 1318   Present on Original Admission: Yes   Wound Number (Wound Clinic Only): 1  Primary Wound Type: Old surgical  Location: Leg  Wound Location Orientation: Left;Anterior;Lower;Medial      Assessments 5/8/2025  1:22 PM 5/21/2025 11:35 AM   Wound Image         Site Assessment Yellow;Red;Moist Yellow;Red;Moist;Painful   Closure Not approximated Not approximated   Drainage Amount Moderate Moderate   Drainage Description Serosanguineous Serosanguineous   Treatments Cleansed;Saline;Vashe;Topical (Barrier/Moisturizer/Ointment);Wound ;Honey Gel (zinc oxide to periwound) Cleansed;Compression Sleeve;Saline;Vashe;Wound ;Topical (Barrier/Moisturizer/Ointment) (zinc oxide to periwound)   Dressing Gauze;Kerlix roll;Tape (kerramax) Gauze;Tape;Ivan (Silver hydrogel)   Dressing Changed New New   Dressing Status Clean;Dry;Intact Clean;Dry;Intact   Wound Length (cm) 1.7 cm 1.7 cm   Wound  Width (cm) 1.8 cm 1.7 cm   Wound Surface Area (cm^2) 2.4 cm^2 2.27 cm^2   Wound Depth (cm) 0.1 cm 0.1 cm   Wound Volume (cm^3) 0.16 cm^3 0.151 cm^3   Wound Healing % -- 6   Margins Well-defined edges;Attached edges Well-defined edges;Attached edges   Non-staged Wound Description Full thickness Full thickness   Ira-wound Assessment Dry;Intact;Pink Dry;Intact;Edema;Red   Wound Granulation Tissue Red Red   Wound Bed Granulation (%) 10 % 0 %   Wound Bed Epithelium (%) 0 % 0 %   Wound Bed Slough (%) 90 % 80 %   Wound Bed Eschar (%) 0 % 0 %   Wound Bed Fibrin (%) 0 % 0 %   State of Healing Non-healing Early/partial granulation;Non-healing   Wound Odor None None       Active Orders   Date Order Priority Status Authorizing Provider   05/21/25 1146 Debridement Old surgical Left;Anterior;Lower;Medial Leg Routine Active Negra Genao APRN   05/08/25 1615 OP Wound Dressing Routine Active Negra Genao APRN     - Cleansing:    Cleanse with normal saline or wound cleanser     - Cleansing:    Cleanse with Vashe     - Dressing:    Honey gel     - Dressing:    Kerramax/Super absorbent     - Dressing:    Kerlix     - Dressing:    Medipore tape (no substitution)     - Dressing:    Dry gauze     - Dressing:    Sensi-care/zinc     - Frequency:    Change dressing three times per week (and PRN)       Inactive Orders   Date Order Priority Status Authorizing Provider   05/08/25 1358 Debridement Old surgical Left;Anterior;Lower;Medial Leg Routine Completed Negra Genao APRN       Compression Wrap 05/08/25 Leg Left (Active)   Placement Date/Time: 05/08/25 1342   Location: Leg  Wound Location Orientation: Left      Assessments 5/8/2025  1:22 PM 5/21/2025 11:35 AM   Response to Treatment Well tolerated Well tolerated   Compression Layers 2 2   Compression Product Type Tubigrip/Spanda (Size F x 2 medigrip) Tubigrip/Spanda (Size F medigrip over size E)   Dressing Applied Yes Yes   Compression Wrap Location Toes to Knee Toes to Knee    Compression Wrap Status Clean;Dry;Intact Clean;Dry;Intact       Active Orders   Date Order Priority Status Authorizing Provider   05/08/25 1615 OP Wound Dressing Routine Active Negra Genao APRN     - Cleansing:    Cleanse with normal saline or wound cleanser     - Cleansing:    Cleanse with Vashe     - Dressing:    Honey gel     - Dressing:    Kerramax/Super absorbent     - Dressing:    Kerlix     - Dressing:    Medipore tape (no substitution)     - Dressing:    Dry gauze     - Dressing:    Sensi-care/zinc     - Frequency:    Change dressing three times per week (and PRN)       Procedures  ASSESSMENT AND PLAN:      1. Non-healing wound of lower extremity, left, sequela  - Aerobic Bacterial Culture; Future  - Aerobic Bacterial Culture    F/u visit for non-healing surgical wound on LLE, s/p ED&C for removal of SCC 3/2025. Wound was previously treated with 3 rounds of antibiotics for wound infection, pt reports only improved with cipro. Has applied Medi-honey and gauze dressing daily for the past 2 weeks and wore 2 layers of Medigrip F for edema management. Hindering factors for wound healing include HTN, venous insufficiency w/edema, T2DM.     Wound is dry with slough, small granulation   Measurements unchanged  Periwound hyperpigmented, dry, mild erythema, no increased warmth  Wound pain present upon touch, says pain is same as previous visit  Skin sensitive, thinks erythema could be from tape   No increase in warmth of skin, fever, purulent drainage  Wound selectively debrided, rinsed with NS, wound culture taken  Site cleansed with Vashe prior to applying new dressing    Wound Care:  -Zinc oxide to periwound  -Silver gel to wound with gauze cover drsg secured with conforming role and tape  -Change dressing daily and PRN  -Discussed avoiding getting wound wet in shower, wear cast cover  -Medi- E, double layer applied for edema management (has 20-30 mmHg stockings at home, okay to wear  instead)    Reviewed importance of the following:  -Nutrition for wound healing: increase protein intake, lower carb and sugar intake, need vit A, B's, C, D and zinc for wound healing along with healthy diet  -Minimize sodium intake and take medication for HTN as prescribed  -Elevate legs above heart level for 30 minutes 3x/day to reduce swelling  -Calf exercises throughout the day, stay active, but avoid standing for prolonged periods of time  -Monitor for s/sx of infection by checking skin daily, call with any concerns    Risks, benefits, and alternatives of current treatment plan discussed in detail.  Questions and concerns addressed. Red flags to RTC or ED reviewed.  Patient agrees to plan.      Return in about 1 week (around 5/28/2025) for NIKKI Omer x 30 minutes.      I spent 30 minutes with the patient. This time included:  preparing to see the patient (eg, review notes and recent diagnostics), seeing the patient, performing a medically appropriate examination and/or evaluation, counseling and educating the patient, and documenting in the record.    NOTE TO PATIENT: The 21st Century Cures Act makes clinical notes like these available to patients in the interest of transparency. Clinical notes are medical documents used by physicians and care providers to communicate with each other. These documents include medical language and terminology, abbreviations, and treatment information that may sound technical and at times possibly unfamiliar. In addition, at times, the verbiage may appear blunt or direct. These documents are one tool providers use to communicate relevant information and clinical opinions of the care providers in a way that allows common understanding of the clinical context.          [1]   Current Outpatient Medications:     lisinopril-hydroCHLOROthiazide 20-25 MG Oral Tab, Take 1 tablet by mouth daily., Disp: 90 tablet, Rfl: 0    XARELTO 20 MG Oral Tab, TAKE 1 TABLET(20 MG) BY MOUTH DAILY WITH  FOOD, Disp: 90 tablet, Rfl: 1    ROSUVASTATIN 10 MG Oral Tab, TAKE 1 TABLET(10 MG) BY MOUTH EVERY NIGHT, Disp: 90 tablet, Rfl: 0    Misc. Devices (PULSE OXIMETER FOR FINGER) Does not apply Misc, 1 each every 4 (four) hours as needed., Disp: 1 each, Rfl: 0    GEMFIBROZIL 600 MG Oral Tab, TAKE 1 TABLET(600 MG) BY MOUTH TWICE DAILY BEFORE MEALS, Disp: 180 tablet, Rfl: 1    gabapentin 100 MG Oral Cap, TAKE 1 CAPSULE BY MOUTH EVERY MORNING AND 2 CAPSULES EVERY EVENING, Disp: 270 capsule, Rfl: 3    Glucose Blood (ACCU-CHEK GUIDE) In Vitro Strip, 1 strip by In Vitro route daily., Disp: 100 strip, Rfl: 3    Accu-Chek Softclix Lancets Does not apply Misc, Check blood glucose levels daily, Disp: 100 each, Rfl: 3    Vitamin D3, Cholecalciferol, 25 MCG (1000 UT) Oral Tab, Take 2,000 Units by mouth daily., Disp: , Rfl:     Budesonide-Formoterol Fumarate (SYMBICORT) 160-4.5 MCG/ACT Inhalation Aerosol, Inhale 2 puffs into the lungs 2 (two) times daily., Disp: 3 Inhaler, Rfl: 3    Albuterol Sulfate  (90 Base) MCG/ACT Inhalation Aero Soln, Inhale 2 puffs into the lungs every 4 (four) hours as needed., Disp: 1 Inhaler, Rfl: 3    Folic Acid-Vit B6-Vit B12 (FOLBEE) 2.5-25-1 MG Oral Tab, Take 1 tablet by mouth daily., Disp: 90 tablet, Rfl: 3    metRONIDAZOLE 0.75 % External Cream, Apply to AA on face daily, Disp: 45 g, Rfl: 2    Fluticasone Propionate 50 MCG/ACT Nasal Suspension, 2 sprays by Each Nare route daily. (Patient taking differently: 2 sprays by Each Nare route daily as needed.  ), Disp: 3 Bottle, Rfl: 3    Multiple Vitamins-Minerals (MULTIVITAL) Oral Tab, Take 1 tablet by mouth daily., Disp: , Rfl:     Psyllium 48.57 % Oral Powder, Take by mouth. 2 TBS daily , Disp: , Rfl:     fexofenadine 180 MG Oral Tab, Take 180 mg by mouth daily as needed.  , Disp: , Rfl:   [2]   Allergies  Allergen Reactions    Other HIVES     Atkins brand probiotics  Itching with Dial Soap (Bar soap)     Shellfish HIVES    Aspirin OTHER (SEE  COMMENTS)     Stomach pain-the 325mg    Cardizem [Diltiazem Hcl]      Doesn't remember    Ceftin      Doesn't remember    Guaifed [Pseudoephedrine-Guaifenesin]      Doesn't remember    Adhesive Tape OTHER (SEE COMMENTS)     Some bandaids only redness   [3]   Patient Active Problem List  Diagnosis    HTN (hypertension)    RENATE on CPAP    Toxoplasmosis chorioretinitis of right eye    Morbid obesity (HCC)    Combined forms of age-related cataract of both eyes    PVD (posterior vitreous detachment)    Ocular migraine    BPPV (benign paroxysmal positional vertigo), right    Pure hypercholesterolemia    Varicose veins of both legs with edema    Edema of both lower extremities due to peripheral venous insufficiency    Thrombocytosis    Aortic atherosclerosis    Chronic diastolic CHF (congestive heart failure) (HCC)    Abnormal EEG    Jerking movements of extremities    Chronic obstructive pulmonary disease, unspecified COPD type ([facility])    BMI 40.0-44.9, adult ([facility])    Cerebellar atrophy    Stage 3a chronic kidney disease ([facility])    Chronic deep vein thrombosis (DVT) of femoral vein of left lower extremity (HCC)    Chronic bronchitis (HCC)    Other pulmonary embolism without acute cor pulmonale, unspecified chronicity ([facility])    Mild emphysema (HCC)    Type 2 diabetes mellitus with cataract (HCC)    Type 2 diabetes mellitus with stage 3 chronic kidney disease, without long-term current use of insulin (HCC)    Chronic respiratory failure with hypoxia, on home oxygen therapy ([facility])   [4]   Past Medical History:   CKD (chronic kidney disease)    COPD    mild, no O2 at this time    Deep vein thrombosis (HCC)    left after knee scope - treated and resolved    DM2 (diabetes mellitus, type 2) (HCC)    DM2 (diabetes mellitus, type 2) ([facility])    Glucose intolerance (pre-diabetes)    Hypercholesterolemia    Hypertension    MENOPAUSE    OBESITY    Osteoarthritis    SEASONAL ALLERGIES    Skin cancer of face    basal cell    Sleep apnea    AHI  4/ RDI 29/ SaO2 84%/ CPAP 8 with oxygen at 2 l/min entrained/ [facility]    Superficial thrombophlebitis    left thigh    Tendonitis of wrist, left    Transient ischemic attack (TIA)    possible TIA    Venous insufficiency

## 2025-05-21 ENCOUNTER — OFFICE VISIT (OUTPATIENT)
Dept: WOUND CARE | Facility: HOSPITAL | Age: 80
End: 2025-05-21
Attending: NURSE PRACTITIONER
Payer: MEDICARE

## 2025-05-21 VITALS
TEMPERATURE: 98 F | HEART RATE: 90 BPM | OXYGEN SATURATION: 94 % | SYSTOLIC BLOOD PRESSURE: 133 MMHG | DIASTOLIC BLOOD PRESSURE: 79 MMHG | RESPIRATION RATE: 18 BRPM

## 2025-05-21 DIAGNOSIS — T81.89XD NON-HEALING SURGICAL WOUND, SUBSEQUENT ENCOUNTER: ICD-10-CM

## 2025-05-21 DIAGNOSIS — S81.802S NON-HEALING WOUND OF LOWER EXTREMITY, LEFT, SEQUELA: Primary | ICD-10-CM

## 2025-05-21 DIAGNOSIS — I83.892 VARICOSE VEINS OF LEFT LEG WITH EDEMA: ICD-10-CM

## 2025-05-21 DIAGNOSIS — C44.729 SQUAMOUS CELL CARCINOMA OF LEFT LOWER LEG: ICD-10-CM

## 2025-05-21 PROCEDURE — 97597 DBRDMT OPN WND 1ST 20 CM/<: CPT | Performed by: NURSE PRACTITIONER

## 2025-05-21 PROCEDURE — 87070 CULTURE OTHR SPECIMN AEROBIC: CPT | Performed by: NURSE PRACTITIONER

## 2025-05-21 NOTE — PROGRESS NOTES
Patient ID: Viridiana French is a 80 year old female.    Debridement Old surgical Left;Anterior;Lower;Medial Leg   Wound 05/08/25 1 Leg Left;Anterior;Lower;Medial    Performed by: Negra Genao APRN  Authorized by: Negra Genao APRN      Consent   Consent obtained? verbal  Consent given by: patient  Risks discussed? procedural risks discussed  Time out called at 5/21/2025 11:46 AM  Immediately prior to the procedure a time out was called and the performing provider verified the correct patient, procedure, equipment, support staff, and site/side marked as required.    Debridement Details  Performed by: NP  Debridement type: conservative sharp  Pain control: lidocaine 2%  Pain control administration type: topical    Pre-debridement measurements  Length (cm): 1.7  Width (cm): 1.7  Depth (cm): 0.1  Surface Area (cm^2): 2.27    Post-debridement measurements  Length (cm): 1.7  Width (cm): 1.7  Depth (cm): 0.1  Percent debrided: 100%  Surface Area (cm^2): 2.27  Area Debrided (cm^2): 2.27  Volume (cm^3): 0.15    Devitalized tissue debrided: slough  Instrument(s) utilized: curette  Bleeding: none  Hemostasis obtained with: not applicable  Procedural pain (0-10): 3  Post-procedural pain: 1   Response to treatment: procedure was tolerated well

## 2025-05-21 NOTE — PATIENT INSTRUCTIONS
Changing your dressing:              Wash your hands with soap and water.   Remove old dressing, discard into plastic bag and place into trash.   Cleanse the wound with Normal Saline or specified wound cleanser prior to applying a clean dressing using gauze sponges, not tissues or cotton balls.   Pat dry using gauze sponges, not tissue or cotton balls. Bleeding is ok.    APPLY Dressing:  -Zinc oxide to skin around wound  -Silver gel to cover wound  -Cover with gauze and secure with rolled gauze and tape (an alternate cover dressing Aquacel bordered foam with silicone adhesive to try out if you'd like)  -Wear the compression sleeves to help manage swelling  -Change the dressing every day    A few other things:  -Wear a cast cover if showering so the wound doesn't get wet  -Watch for increasing redness around the wound or other signs/symptoms of infection  -Call the clinic with any concerns

## 2025-05-29 ENCOUNTER — APPOINTMENT (OUTPATIENT)
Dept: WOUND CARE | Facility: HOSPITAL | Age: 80
End: 2025-05-29
Attending: NURSE PRACTITIONER
Payer: MEDICARE

## 2025-05-29 VITALS
OXYGEN SATURATION: 95 % | SYSTOLIC BLOOD PRESSURE: 122 MMHG | HEART RATE: 88 BPM | DIASTOLIC BLOOD PRESSURE: 76 MMHG | TEMPERATURE: 99 F | RESPIRATION RATE: 18 BRPM

## 2025-05-29 DIAGNOSIS — T81.89XD SURGICAL WOUND, NON HEALING, SUBSEQUENT ENCOUNTER: ICD-10-CM

## 2025-05-29 DIAGNOSIS — S81.802S NON-HEALING WOUND OF LOWER EXTREMITY, LEFT, SEQUELA: Primary | ICD-10-CM

## 2025-05-29 DIAGNOSIS — C44.729 SQUAMOUS CELL CARCINOMA OF LEFT LOWER LEG: ICD-10-CM

## 2025-05-29 PROCEDURE — 97597 DBRDMT OPN WND 1ST 20 CM/<: CPT | Performed by: NURSE PRACTITIONER

## 2025-05-29 NOTE — PROGRESS NOTES
Chief Complaint   Patient presents with    Wound Recheck     Patient is here for a follow-up of her left lower leg wound     HPI:   5/29/25: F/u LLE wound. Says the wound gel and gauze was sticking to the wound with every dressing change. She was able to remove with applying saline. Says redness around wound has resolved, thinks it was the adhesive.     5/21/25: F/u visit LLE wound. Says she has been applying medi-honey and gauze to the wound for the past 2 weeks along with wearing a double layer of Medi- that she was given. She has been washing the wound in the shower and leaving open to air after showering. Says the wound has increased pain for at least an hour after showering.     5/8/25: 79 yr old female presents to wound clinic for evaluation of wound on her LLE. She is s/p ED&C of SCC of the left lower shin 3/6/25.  The excision site has been being treated for infection by dermatology. She was prescribed doxy, clinda, then cipro 500 mg BID (mid-April). Wound culture 4/3/25 grew Proteus mirabilis, E. Coli and staph species. No growth on wound cx 4/24/25. Pt reports the wound is no longer tender. She applies vaseline to the wound daily. She has a hx of venous insufficiency and wears compression stockings on and off. Other medical problems include T2DM (A1c 7.0% 4/23/25), HTN, HLD, DVT on xarelto.     Medications - Current[1]    Allergies[2]     Problem List[3]    Past Medical History[4]      REVIEW OF SYSTEMS:   Review of Systems   Constitutional:  Negative for activity change, appetite change, chills, fatigue and fever.   Respiratory:  Negative for shortness of breath.    Cardiovascular:  Positive for leg swelling. Negative for chest pain.   Gastrointestinal:  Negative for abdominal pain.   Musculoskeletal:  Negative for gait problem.   Skin:  Positive for wound. Negative for color change and rash.      PHYSICAL EXAM:   /76   Pulse 88   Temp 98.5 °F (36.9 °C)   Resp 18   SpO2 95%    Estimated body  mass index is 39.54 kg/m² as calculated from the following:    Height as of 3/22/22: 66\".    Weight as of 3/22/22: 245 lb.   Vital signs reviewed.Appears stated age, well groomed.    Physical Exam  Constitutional:       General: She is not in acute distress.     Appearance: Normal appearance. She is obese. She is not ill-appearing.   HENT:      Head: Normocephalic and atraumatic.   Cardiovascular:      Rate and Rhythm: Normal rate.      Pulses:           Dorsalis pedis pulses are 2+ on the left side.        Posterior tibial pulses are 2+ on the left side.   Pulmonary:      Effort: Pulmonary effort is normal.   Musculoskeletal:      Left lower leg: Edema present.   Feet:      Comments: 5/8/25: Left PT/DP pulses w/biphasic wave sounds using handheld doppler  Skin:     General: Skin is warm and dry.      Capillary Refill: Capillary refill takes 2 to 3 seconds.      Findings: Wound (LLE, see wound care flowsheet) present.   Neurological:      General: No focal deficit present.      Mental Status: She is alert and oriented to person, place, and time.   Psychiatric:         Mood and Affect: Mood normal.         Behavior: Behavior normal.          Wound 05/08/25 1 Leg Left;Anterior;Lower;Medial (Active)   Date First Assessed/Time First Assessed: 05/08/25 1318   Present on Original Admission: Yes   Wound Number (Wound Clinic Only): 1  Primary Wound Type: Old surgical  Location: Leg  Wound Location Orientation: Left;Anterior;Lower;Medial      Assessments 5/8/2025  1:22 PM 5/29/2025 11:40 AM   Wound Image         Site Assessment Yellow;Red;Moist Yellow;Red;Moist;Painful   Closure Not approximated Not approximated   Drainage Amount Moderate Moderate   Drainage Description Serosanguineous Serosanguineous   Treatments Cleansed;Saline;Vashe;Topical (Barrier/Moisturizer/Ointment);Wound ;Honey Gel (zinc oxide to periwound) Cleansed;Vashe;Topical (Barrier/Moisturizer/Ointment);Compression Sleeve   Dressing Gauze;Kerlix  roll;Tape (kerramax) Vaseline gauze;Aquacel Foam (Zinc oxide to periwound, Silver hydrogel)   Dressing Changed New Changed   Dressing Status Clean;Dry;Intact Clean;Dry;Intact   Wound Length (cm) 1.7 cm 1.5 cm   Wound Width (cm) 1.8 cm 1.3 cm   Wound Surface Area (cm^2) 2.4 cm^2 1.53 cm^2   Wound Depth (cm) 0.1 cm 0.1 cm   Wound Volume (cm^3) 0.16 cm^3 0.102 cm^3   Wound Healing % -- 36   Margins Well-defined edges;Attached edges Well-defined edges;Attached edges   Non-staged Wound Description Full thickness Full thickness   Ira-wound Assessment Dry;Intact;Pink Dry;Intact;Edema;Red   Wound Granulation Tissue Red Red   Wound Bed Granulation (%) 10 % 90 %   Wound Bed Epithelium (%) 0 % 0 %   Wound Bed Slough (%) 90 % 10 %   Wound Bed Eschar (%) 0 % 0 %   Wound Bed Fibrin (%) 0 % 0 %   State of Healing Non-healing Early/partial granulation   Wound Odor None None       Active Orders   Date Order Priority Status Authorizing Provider   05/08/25 1615 OP Wound Dressing Routine Active Negra Genao APRN     - Cleansing:    Cleanse with normal saline or wound cleanser     - Cleansing:    Cleanse with Vashe     - Dressing:    Honey gel     - Dressing:    Kerramax/Super absorbent     - Dressing:    Kerlix     - Dressing:    Medipore tape (no substitution)     - Dressing:    Dry gauze     - Dressing:    Sensi-care/zinc     - Frequency:    Change dressing three times per week (and PRN)       Inactive Orders   Date Order Priority Status Authorizing Provider   05/21/25 1340 Debridement Old surgical Left;Anterior;Lower;Medial Leg Routine Discontinued Negra Genao APRN   05/21/25 1146 Debridement Old surgical Left;Anterior;Lower;Medial Leg Routine Completed Negra Genao APRN   05/08/25 1358 Debridement Old surgical Left;Anterior;Lower;Medial Leg Routine Completed Negra Genao APRN       Compression Wrap 05/08/25 Leg Left (Active)   Placement Date/Time: 05/08/25 1342   Location: Leg  Wound Location Orientation: Left       Assessments 5/8/2025  1:22 PM 5/29/2025 11:40 AM   Response to Treatment Well tolerated Well tolerated   Compression Layers 2 2   Compression Product Type Tubigrip/Spanda (Size F x 2 medigrip) Tubigrip/Spanda (Size F medigrip over size E)   Dressing Applied Yes Yes   Compression Wrap Location Toes to Knee Toes to Knee   Compression Wrap Status Clean;Dry;Intact Clean;Dry;Intact       Active Orders   Date Order Priority Status Authorizing Provider   05/08/25 1615 OP Wound Dressing Routine Active Negra Genao APRN     - Cleansing:    Cleanse with normal saline or wound cleanser     - Cleansing:    Cleanse with Vashe     - Dressing:    Honey gel     - Dressing:    Kerramax/Super absorbent     - Dressing:    Kerlix     - Dressing:    Medipore tape (no substitution)     - Dressing:    Dry gauze     - Dressing:    Sensi-care/zinc     - Frequency:    Change dressing three times per week (and PRN)       Debridement Old surgical Left;Anterior;Lower;Medial Leg   Wound 05/08/25 1 Leg Left;Anterior;Lower;Medial    Performed by: Negra Genao APRN  Authorized by: Negra Genao APRN      Consent   Consent obtained? verbal  Consent given by: patient  Risks discussed? procedural risks discussed  Time out called at 5/29/2025 11:45 PM  Immediately prior to the procedure a time out was called and the performing provider verified the correct patient, procedure, equipment, support staff, and site/side marked as required.    Debridement Details  Performed by: NP  Debridement type: conservative sharp  Pain control: lidocaine 4%  Pain control administration type: topical    Pre-debridement measurements  Length (cm): 1.5  Width (cm): 1.3  Depth (cm): 0.1  Surface Area (cm^2): 1.53    Post-debridement measurements  Length (cm): 1.5  Width (cm): 1.3  Depth (cm): 0.1  Percent debrided: 100%  Surface Area (cm^2): 1.53  Area Debrided (cm^2): 1.53  Volume (cm^3): 0.1    Devitalized tissue debrided: biofilm and  slough  Instrument(s) utilized: curette  Comment regarding bleeding: scant  Hemostasis obtained with: pressure  Procedural pain (0-10): 3  Post-procedural pain: 0   Response to treatment: procedure was tolerated well      ASSESSMENT AND PLAN:      1. Non-healing wound of lower extremity, left, sequela    2. Surgical wound, non healing, subsequent encounter    3. 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 Wound was previously treated with 3 rounds of antibiotics for wound infection, pt reports only improved with cipro. Hindering factors for wound healing include HTN, venous insufficiency w/edema, T2DM.     Wound measurement improved  Wound remains dry with slough, small increase in granulation   Periwound hyperpigmented, dry, erythema resolved  Site selectively debrided then cleansed w/Vashe prior to applying new dressing    Wound Care:  -Zinc oxide to periwound  -Silver gel to wound with vaseline gauze to help hold moisture, secure with conforming role and tape  -Change dressing daily and PRN  -Continue to avoid getting wound wet in shower, wear cast cover   -Medi- E, double layer applied for edema management (has 20-30 mmHg stockings at home, okay to wear instead)    Reviewed importance of the following:  -Nutrition for wound healing: increase protein intake, lower carb and sugar intake, need vit A, B's, C, D and zinc for wound healing along with healthy diet  -Minimize sodium intake and take medication for HTN as prescribed  -Elevate legs above heart level for 30 minutes 3x/day to reduce swelling  -Calf exercises throughout the day, stay active, but avoid standing for prolonged periods of time  -Monitor for s/sx of infection by checking skin daily, call with any concerns    Risks, benefits, and alternatives of current treatment plan discussed in detail.  Questions and concerns addressed. Red flags to RTC or ED reviewed.   Patient agrees to plan.      Return in about 1 week (around 6/5/2025) for APN x 30 minutes.    I spent 30 minutes with the patient. This time included:  preparing to see the patient (eg, review notes and recent diagnostics), seeing the patient, performing a medically appropriate examination and/or evaluation, counseling and educating the patient, and documenting in the record.    NOTE TO PATIENT: The 21st Century Cures Act makes clinical notes like these available to patients in the interest of transparency. Clinical notes are medical documents used by physicians and care providers to communicate with each other. These documents include medical language and terminology, abbreviations, and treatment information that may sound technical and at times possibly unfamiliar. In addition, at times, the verbiage may appear blunt or direct. These documents are one tool providers use to communicate relevant information and clinical opinions of the care providers in a way that allows common understanding of the clinical context.          [1]   Current Outpatient Medications:     lisinopril-hydroCHLOROthiazide 20-25 MG Oral Tab, Take 1 tablet by mouth daily., Disp: 90 tablet, Rfl: 0    XARELTO 20 MG Oral Tab, TAKE 1 TABLET(20 MG) BY MOUTH DAILY WITH FOOD, Disp: 90 tablet, Rfl: 1    ROSUVASTATIN 10 MG Oral Tab, TAKE 1 TABLET(10 MG) BY MOUTH EVERY NIGHT, Disp: 90 tablet, Rfl: 0    Misc. Devices (PULSE OXIMETER FOR FINGER) Does not apply Misc, 1 each every 4 (four) hours as needed., Disp: 1 each, Rfl: 0    GEMFIBROZIL 600 MG Oral Tab, TAKE 1 TABLET(600 MG) BY MOUTH TWICE DAILY BEFORE MEALS, Disp: 180 tablet, Rfl: 1    gabapentin 100 MG Oral Cap, TAKE 1 CAPSULE BY MOUTH EVERY MORNING AND 2 CAPSULES EVERY EVENING, Disp: 270 capsule, Rfl: 3    Glucose Blood (ACCU-CHEK GUIDE) In Vitro Strip, 1 strip by In Vitro route daily., Disp: 100 strip, Rfl: 3    Accu-Chek Softclix Lancets Does not apply Misc, Check blood glucose levels daily,  Disp: 100 each, Rfl: 3    Vitamin D3, Cholecalciferol, 25 MCG (1000 UT) Oral Tab, Take 2,000 Units by mouth daily., Disp: , Rfl:     Budesonide-Formoterol Fumarate (SYMBICORT) 160-4.5 MCG/ACT Inhalation Aerosol, Inhale 2 puffs into the lungs 2 (two) times daily., Disp: 3 Inhaler, Rfl: 3    Albuterol Sulfate  (90 Base) MCG/ACT Inhalation Aero Soln, Inhale 2 puffs into the lungs every 4 (four) hours as needed., Disp: 1 Inhaler, Rfl: 3    Folic Acid-Vit B6-Vit B12 (FOLBEE) 2.5-25-1 MG Oral Tab, Take 1 tablet by mouth daily., Disp: 90 tablet, Rfl: 3    metRONIDAZOLE 0.75 % External Cream, Apply to AA on face daily, Disp: 45 g, Rfl: 2    Fluticasone Propionate 50 MCG/ACT Nasal Suspension, 2 sprays by Each Nare route daily. (Patient taking differently: 2 sprays by Each Nare route daily as needed.  ), Disp: 3 Bottle, Rfl: 3    Multiple Vitamins-Minerals (MULTIVITAL) Oral Tab, Take 1 tablet by mouth daily., Disp: , Rfl:     Psyllium 48.57 % Oral Powder, Take by mouth. 2 TBS daily , Disp: , Rfl:     fexofenadine 180 MG Oral Tab, Take 180 mg by mouth daily as needed.  , Disp: , Rfl:   [2]   Allergies  Allergen Reactions    Other HIVES     Atkins brand probiotics  Itching with Dial Soap (Bar soap)     Shellfish HIVES    Aspirin OTHER (SEE COMMENTS)     Stomach pain-the 325mg    Cardizem [Diltiazem Hcl]      Doesn't remember    Ceftin      Doesn't remember    Guaifed [Pseudoephedrine-Guaifenesin]      Doesn't remember    Adhesive Tape OTHER (SEE COMMENTS)     Some bandaids only redness   [3]   Patient Active Problem List  Diagnosis    HTN (hypertension)    RENATE on CPAP    Toxoplasmosis chorioretinitis of right eye    Morbid obesity (HCC)    Combined forms of age-related cataract of both eyes    PVD (posterior vitreous detachment)    Ocular migraine    BPPV (benign paroxysmal positional vertigo), right    Pure hypercholesterolemia    Varicose veins of both legs with edema    Edema of both lower extremities due to  peripheral venous insufficiency    Thrombocytosis    Aortic atherosclerosis    Chronic diastolic CHF (congestive heart failure) (HCC)    Abnormal EEG    Jerking movements of extremities    Chronic obstructive pulmonary disease, unspecified COPD type ([facility])    BMI 40.0-44.9, adult ([facility])    Cerebellar atrophy    Stage 3a chronic kidney disease ([facility])    Chronic deep vein thrombosis (DVT) of femoral vein of left lower extremity (HCC)    Chronic bronchitis (HCC)    Other pulmonary embolism without acute cor pulmonale, unspecified chronicity ([facility])    Mild emphysema (HCC)    Type 2 diabetes mellitus with cataract ([facility])    Type 2 diabetes mellitus with stage 3 chronic kidney disease, without long-term current use of insulin ([facility])    Chronic respiratory failure with hypoxia, on home oxygen therapy ([facility])   [4]   Past Medical History:   CKD (chronic kidney disease)    COPD    mild, no O2 at this time    Deep vein thrombosis (HCC)    left after knee scope - treated and resolved    DM2 (diabetes mellitus, type 2) ([facility])    DM2 (diabetes mellitus, type 2) ([facility])    Glucose intolerance (pre-diabetes)    Hypercholesterolemia    Hypertension    MENOPAUSE    OBESITY    Osteoarthritis    SEASONAL ALLERGIES    Skin cancer of face    basal cell    Sleep apnea    AHI 4/ RDI 29/ SaO2 84%/ CPAP 8 with oxygen at 2 l/min entrained/ [facility]    Superficial thrombophlebitis    left thigh    Tendonitis of wrist, left    Transient ischemic attack (TIA)    possible TIA    Venous insufficiency

## 2025-06-05 ENCOUNTER — OFFICE VISIT (OUTPATIENT)
Dept: WOUND CARE | Facility: HOSPITAL | Age: 80
End: 2025-06-05
Attending: NURSE PRACTITIONER
Payer: MEDICARE

## 2025-06-05 VITALS
SYSTOLIC BLOOD PRESSURE: 148 MMHG | HEART RATE: 85 BPM | TEMPERATURE: 98 F | RESPIRATION RATE: 18 BRPM | DIASTOLIC BLOOD PRESSURE: 74 MMHG | OXYGEN SATURATION: 93 %

## 2025-06-05 DIAGNOSIS — T81.89XD SURGICAL WOUND, NON HEALING, SUBSEQUENT ENCOUNTER: ICD-10-CM

## 2025-06-05 DIAGNOSIS — S81.802S NON-HEALING WOUND OF LOWER EXTREMITY, LEFT, SEQUELA: Primary | ICD-10-CM

## 2025-06-05 DIAGNOSIS — C44.729 SQUAMOUS CELL CARCINOMA OF LEFT LOWER LEG: ICD-10-CM

## 2025-06-05 PROCEDURE — 97597 DBRDMT OPN WND 1ST 20 CM/<: CPT | Performed by: NURSE PRACTITIONER

## 2025-06-05 NOTE — PROGRESS NOTES
Chief Complaint   Patient presents with    Wound Recheck     Here for left ant leg wound recheck no sx of concern     HPI:   6/5/25: F/u LLE wound. Thinks the dressing of vaseline gauze over the silver wound gel has helped to improve moisture in the wound. Gauze isn't sticking like it had been. Pain is on/off, increased after she was more active yesterday. Says she has been wearing double layer medigrip. Wants to switch to thigh high compression stocking she has at home.     5/29/25: F/u LLE wound. Says the wound gel and gauze was sticking to the wound with every dressing change. She was able to remove with applying saline. Says redness around wound has resolved, thinks it was the adhesive.     5/21/25: F/u visit LLE wound. Says she has been applying medi-honey and gauze to the wound for the past 2 weeks along with wearing a double layer of Medi- that she was given. She has been washing the wound in the shower and leaving open to air after showering. Says the wound has increased pain for at least an hour after showering.     5/8/25: 79 yr old female presents to wound clinic for evaluation of wound on her LLE. She is s/p ED&C of SCC of the left lower shin 3/6/25.  The excision site has been being treated for infection by dermatology. She was prescribed doxy, clinda, then cipro 500 mg BID (mid-April). Wound culture 4/3/25 grew Proteus mirabilis, E. Coli and staph species. No growth on wound cx 4/24/25. Pt reports the wound is no longer tender. She applies vaseline to the wound daily. She has a hx of venous insufficiency and wears compression stockings on and off. Other medical problems include T2DM (A1c 7.0% 4/23/25), HTN, HLD, DVT on xarelto.     Medications - Current[1]    Allergies[2]     Problem List[3]    Past Medical History[4]      REVIEW OF SYSTEMS:   Review of Systems   Constitutional:  Negative for activity change, appetite change, chills, fatigue and fever.   Respiratory:  Negative for shortness of  breath.    Cardiovascular:  Positive for leg swelling. Negative for chest pain.   Gastrointestinal:  Negative for abdominal pain.   Musculoskeletal:  Negative for gait problem.   Skin:  Positive for wound. Negative for color change and rash.      PHYSICAL EXAM:   /74   Pulse 85   Temp 98 °F (36.7 °C)   Resp 18   SpO2 93%    Estimated body mass index is 39.54 kg/m² as calculated from the following:    Height as of 3/22/22: 66\".    Weight as of 3/22/22: 245 lb.   Vital signs reviewed.Appears stated age, well groomed.    Physical Exam  Constitutional:       General: She is not in acute distress.     Appearance: Normal appearance. She is obese. She is not ill-appearing.   HENT:      Head: Normocephalic and atraumatic.   Cardiovascular:      Rate and Rhythm: Normal rate.      Pulses:           Dorsalis pedis pulses are 2+ on the left side.        Posterior tibial pulses are 2+ on the left side.   Pulmonary:      Effort: Pulmonary effort is normal.   Musculoskeletal:      Left lower leg: Edema present.   Feet:      Comments: 5/8/25: Left PT/DP pulses w/biphasic wave sounds using handheld doppler  Skin:     General: Skin is warm and dry.      Capillary Refill: Capillary refill takes 2 to 3 seconds.      Findings: Wound (LLE, see wound care flowsheet) present.   Neurological:      General: No focal deficit present.      Mental Status: She is alert and oriented to person, place, and time.   Psychiatric:         Mood and Affect: Mood normal.         Behavior: Behavior normal.          Wound 05/08/25 1 Leg Left;Anterior;Lower;Medial (Active)   Date First Assessed/Time First Assessed: 05/08/25 1318   Present on Original Admission: Yes   Wound Number (Wound Clinic Only): 1  Primary Wound Type: Old surgical  Location: Leg  Wound Location Orientation: Left;Anterior;Lower;Medial      Assessments 5/8/2025  1:22 PM 6/5/2025  1:13 PM   Wound Image         Site Assessment Yellow;Red;Moist Yellow;Red;Moist;Painful   Closure  Not approximated Not approximated   Drainage Amount Moderate Moderate   Drainage Description Serosanguineous Serosanguineous   Treatments Cleansed;Saline;Vashe;Topical (Barrier/Moisturizer/Ointment);Wound ;Honey Gel (zinc oxide to periwound) Cleansed;Vashe;Topical (Barrier/Moisturizer/Ointment);Compression Sleeve   Dressing Gauze;Kerlix roll;Tape (kerramax) Vaseline gauze;Aquacel Foam (Zinc oxide to periwound, Silver hydrogel)   Dressing Changed New Changed   Dressing Status Clean;Dry;Intact Clean;Dry;Intact   Wound Length (cm) 1.7 cm 1.5 cm   Wound Width (cm) 1.8 cm 1.2 cm   Wound Surface Area (cm^2) 2.4 cm^2 1.41 cm^2   Wound Depth (cm) 0.1 cm 0.1 cm   Wound Volume (cm^3) 0.16 cm^3 0.094 cm^3   Wound Healing % -- 41   Margins Well-defined edges;Attached edges Well-defined edges;Attached edges   Non-staged Wound Description Full thickness Full thickness   Ira-wound Assessment Dry;Intact;Pink Dry;Intact;Edema;Red   Wound Granulation Tissue Red Red   Wound Bed Granulation (%) 10 % 95 %   Wound Bed Epithelium (%) 0 % 0 %   Wound Bed Slough (%) 90 % 5 %   Wound Bed Eschar (%) 0 % 0 %   Wound Bed Fibrin (%) 0 % 0 %   State of Healing Non-healing Fully granulated   Wound Odor None None       Active Orders   Date Order Priority Status Authorizing Provider   05/08/25 1615 OP Wound Dressing Routine Active Negra Genao APRN     - Cleansing:    Cleanse with normal saline or wound cleanser     - Cleansing:    Cleanse with Vashe     - Dressing:    Honey gel     - Dressing:    Kerramax/Super absorbent     - Dressing:    Kerlix     - Dressing:    Medipore tape (no substitution)     - Dressing:    Dry gauze     - Dressing:    Sensi-care/zinc     - Frequency:    Change dressing three times per week (and PRN)       Inactive Orders   Date Order Priority Status Authorizing Provider   06/05/25 1326 Debridement Old surgical Left;Anterior;Lower;Medial Leg Routine Completed Negra Genao APRN   05/29/25 1428 Debridement  Old surgical Left;Anterior;Lower;Medial Leg Routine Completed Negra Genao APRN   05/21/25 1340 Debridement Old surgical Left;Anterior;Lower;Medial Leg Routine Discontinued Negra Genao APRN   05/21/25 1146 Debridement Old surgical Left;Anterior;Lower;Medial Leg Routine Completed Negra Genao APRN   05/08/25 1358 Debridement Old surgical Left;Anterior;Lower;Medial Leg Routine Completed Negra Genao APRN       Compression Wrap 05/08/25 Leg Left (Active)   Placement Date/Time: 05/08/25 1342   Location: Leg  Wound Location Orientation: Left      Assessments 5/8/2025  1:22 PM 6/5/2025  1:13 PM   Response to Treatment Well tolerated Well tolerated   Compression Layers 2 2   Compression Product Type Tubigrip/Spanda (Size F x 2 medigrip) Tubigrip/Spanda (Size F medigrip over size E)   Dressing Applied Yes Yes   Compression Wrap Location Toes to Knee Toes to Knee   Compression Wrap Status Clean;Dry;Intact Clean;Dry;Intact       Active Orders   Date Order Priority Status Authorizing Provider   05/08/25 1615 OP Wound Dressing Routine Active Negra Genao APRN     - Cleansing:    Cleanse with normal saline or wound cleanser     - Cleansing:    Cleanse with Vashe     - Dressing:    Honey gel     - Dressing:    Kerramax/Super absorbent     - Dressing:    Kerlix     - Dressing:    Medipore tape (no substitution)     - Dressing:    Dry gauze     - Dressing:    Sensi-care/zinc     - Frequency:    Change dressing three times per week (and PRN)       Procedures  ASSESSMENT AND PLAN:      1. Non-healing wound of lower extremity, left, sequela    2. Surgical wound, non healing, subsequent encounter    3. 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 Wound was previously treated with 3 rounds of antibiotics for wound infection, pt reports only improved with cipro. Hindering factors for wound healing include HTN, venous  insufficiency w/edema, T2DM.     Wound measurements improved  Wound increased moisture, still slightly dry  Increased granulation with adherent slough  Periwound hyperpigmented, dry   Site selectively debrided then cleansed w/Vashe prior to applying new dressing    Wound Care:  -Zinc oxide to periwound  -Silver gel to wound with vaseline gauze to help hold moisture, secure with conforming role and tape  -Change dressing daily and PRN  -Continue to avoid getting wound wet in shower, wear cast cover or can use waterproof dressing and change dressing right after showering  -Double layer Medi- E applied for edema management (has 20-30 mmHg stockings at home, okay to wear instead if prefers)    Continue the following:  -Nutrition for wound healing: increase protein intake, lower carb and sugar intake, need vit A, B's, C, D and zinc for wound healing along with healthy diet  -Minimize sodium intake and take medication for HTN as prescribed  -Elevate legs above heart level for 30 minutes 3x/day to reduce swelling  -Calf exercises throughout the day, stay active, but avoid standing for prolonged periods of time  -Monitor for s/sx of infection by checking skin daily, call with any concerns    Risks, benefits, and alternatives of current treatment plan discussed in detail.  Questions and concerns addressed. Red flags to RTC or ED reviewed.  Patient agrees to plan.      Return in about 1 week (around 6/12/2025) for NIKKI Omer x 30 minutes.    I spent 30 minutes with the patient. This time included:  preparing to see the patient (eg, review notes and recent diagnostics), seeing the patient, performing a medically appropriate examination and/or evaluation, counseling and educating the patient, and documenting in the record.    NOTE TO PATIENT: The 21st Century Cures Act makes clinical notes like these available to patients in the interest of transparency. Clinical notes are medical documents used by physicians and care  providers to communicate with each other. These documents include medical language and terminology, abbreviations, and treatment information that may sound technical and at times possibly unfamiliar. In addition, at times, the verbiage may appear blunt or direct. These documents are one tool providers use to communicate relevant information and clinical opinions of the care providers in a way that allows common understanding of the clinical context.          [1]   Current Outpatient Medications:     lisinopril-hydroCHLOROthiazide 20-25 MG Oral Tab, Take 1 tablet by mouth daily., Disp: 90 tablet, Rfl: 0    XARELTO 20 MG Oral Tab, TAKE 1 TABLET(20 MG) BY MOUTH DAILY WITH FOOD, Disp: 90 tablet, Rfl: 1    ROSUVASTATIN 10 MG Oral Tab, TAKE 1 TABLET(10 MG) BY MOUTH EVERY NIGHT, Disp: 90 tablet, Rfl: 0    Misc. Devices (PULSE OXIMETER FOR FINGER) Does not apply Misc, 1 each every 4 (four) hours as needed., Disp: 1 each, Rfl: 0    GEMFIBROZIL 600 MG Oral Tab, TAKE 1 TABLET(600 MG) BY MOUTH TWICE DAILY BEFORE MEALS, Disp: 180 tablet, Rfl: 1    gabapentin 100 MG Oral Cap, TAKE 1 CAPSULE BY MOUTH EVERY MORNING AND 2 CAPSULES EVERY EVENING, Disp: 270 capsule, Rfl: 3    Glucose Blood (ACCU-CHEK GUIDE) In Vitro Strip, 1 strip by In Vitro route daily., Disp: 100 strip, Rfl: 3    Accu-Chek Softclix Lancets Does not apply Misc, Check blood glucose levels daily, Disp: 100 each, Rfl: 3    Vitamin D3, Cholecalciferol, 25 MCG (1000 UT) Oral Tab, Take 2,000 Units by mouth daily., Disp: , Rfl:     Budesonide-Formoterol Fumarate (SYMBICORT) 160-4.5 MCG/ACT Inhalation Aerosol, Inhale 2 puffs into the lungs 2 (two) times daily., Disp: 3 Inhaler, Rfl: 3    Albuterol Sulfate  (90 Base) MCG/ACT Inhalation Aero Soln, Inhale 2 puffs into the lungs every 4 (four) hours as needed., Disp: 1 Inhaler, Rfl: 3    Folic Acid-Vit B6-Vit B12 (FOLBEE) 2.5-25-1 MG Oral Tab, Take 1 tablet by mouth daily., Disp: 90 tablet, Rfl: 3    metRONIDAZOLE 0.75 %  External Cream, Apply to AA on face daily, Disp: 45 g, Rfl: 2    Fluticasone Propionate 50 MCG/ACT Nasal Suspension, 2 sprays by Each Nare route daily. (Patient taking differently: 2 sprays by Each Nare route daily as needed.  ), Disp: 3 Bottle, Rfl: 3    Multiple Vitamins-Minerals (MULTIVITAL) Oral Tab, Take 1 tablet by mouth daily., Disp: , Rfl:     Psyllium 48.57 % Oral Powder, Take by mouth. 2 TBS daily , Disp: , Rfl:     fexofenadine 180 MG Oral Tab, Take 180 mg by mouth daily as needed.  , Disp: , Rfl:   [2]   Allergies  Allergen Reactions    Other HIVES     Atkins brand probiotics  Itching with Dial Soap (Bar soap)     Shellfish HIVES    Aspirin OTHER (SEE COMMENTS)     Stomach pain-the 325mg    Cardizem [Diltiazem Hcl]      Doesn't remember    Ceftin      Doesn't remember    Guaifed [Pseudoephedrine-Guaifenesin]      Doesn't remember    Adhesive Tape OTHER (SEE COMMENTS)     Some bandaids only redness   [3]   Patient Active Problem List  Diagnosis    HTN (hypertension)    RENATE on CPAP    Toxoplasmosis chorioretinitis of right eye    Morbid obesity (HCC)    Combined forms of age-related cataract of both eyes    PVD (posterior vitreous detachment)    Ocular migraine    BPPV (benign paroxysmal positional vertigo), right    Pure hypercholesterolemia    Varicose veins of both legs with edema    Edema of both lower extremities due to peripheral venous insufficiency    Thrombocytosis    Aortic atherosclerosis    Chronic diastolic CHF (congestive heart failure) ([facility])    Abnormal EEG    Jerking movements of extremities    Chronic obstructive pulmonary disease, unspecified COPD type ([facility])    BMI 40.0-44.9, adult ([facility])    Cerebellar atrophy    Stage 3a chronic kidney disease ([facility])    Chronic deep vein thrombosis (DVT) of femoral vein of left lower extremity ([facility])    Chronic bronchitis ([facility])    Other pulmonary embolism without acute cor pulmonale, unspecified chronicity ([facility])    Mild emphysema ([facility])    Type 2 diabetes  mellitus with cataract (HCC)    Type 2 diabetes mellitus with stage 3 chronic kidney disease, without long-term current use of insulin (HCC)    Chronic respiratory failure with hypoxia, on home oxygen therapy (HCC)   [4]   Past Medical History:   CKD (chronic kidney disease)    COPD    mild, no O2 at this time    Deep vein thrombosis (HCC)    left after knee scope - treated and resolved    DM2 (diabetes mellitus, type 2) (HCC)    DM2 (diabetes mellitus, type 2) (HCC)    Glucose intolerance (pre-diabetes)    Hypercholesterolemia    Hypertension    MENOPAUSE    OBESITY    Osteoarthritis    SEASONAL ALLERGIES    Skin cancer of face    basal cell    Sleep apnea    AHI 4/ RDI 29/ SaO2 84%/ CPAP 8 with oxygen at 2 l/min entrained/ [employer] [facility]    Superficial thrombophlebitis    left thigh    Tendonitis of wrist, left    Transient ischemic attack (TIA)    possible TIA    Venous insufficiency

## 2025-06-05 NOTE — PROGRESS NOTES
Patient ID: Viridiana French is a 80 year old female.    Debridement Old surgical Left;Anterior;Lower;Medial Leg   Wound 05/08/25 1 Leg Left;Anterior;Lower;Medial    Performed by: Negra Genao APRN  Authorized by: Negra Genao APRN      Consent   Consent obtained? verbal  Consent given by: patient  Risks discussed? procedural risks discussed  Time out called at 6/5/2025 1:26 PM  Immediately prior to the procedure a time out was called and the performing provider verified the correct patient, procedure, equipment, support staff, and site/side marked as required.    Debridement Details  Performed by: NP  Debridement type: conservative sharp  Pain control: lidocaine 2% and benzocaine 20%  Pain control administration type: topical    Pre-debridement measurements  Length (cm): 1.5  Width (cm): 1.2  Depth (cm): 0.1  Surface Area (cm^2): 1.41    Post-debridement measurements  Length (cm): 1.5  Width (cm): 1.2  Depth (cm): 0.1  Percent debrided: 100%  Surface Area (cm^2): 1.41  Area Debrided (cm^2): 1.41  Volume (cm^3): 0.09    Devitalized tissue debrided: biofilm and slough  Instrument(s) utilized: forceps and curette  Bleeding: small  Comment regarding bleeding: on xarelto  Hemostasis obtained with: pressure  Procedural pain (0-10): 3  Post-procedural pain: 0   Response to treatment: procedure was tolerated well

## 2025-06-12 ENCOUNTER — OFFICE VISIT (OUTPATIENT)
Dept: WOUND CARE | Facility: HOSPITAL | Age: 80
End: 2025-06-12
Attending: NURSE PRACTITIONER
Payer: MEDICARE

## 2025-06-12 VITALS
SYSTOLIC BLOOD PRESSURE: 141 MMHG | DIASTOLIC BLOOD PRESSURE: 77 MMHG | TEMPERATURE: 98 F | RESPIRATION RATE: 18 BRPM | OXYGEN SATURATION: 95 % | HEART RATE: 78 BPM

## 2025-06-12 DIAGNOSIS — S81.802S NON-HEALING WOUND OF LOWER EXTREMITY, LEFT, SEQUELA: Primary | ICD-10-CM

## 2025-06-12 DIAGNOSIS — Z98.890 HX OF SQUAMOUS CELL CARCINOMA EXCISION: ICD-10-CM

## 2025-06-12 DIAGNOSIS — Z85.9 HX OF SQUAMOUS CELL CARCINOMA EXCISION: ICD-10-CM

## 2025-06-12 PROCEDURE — 11042 DBRDMT SUBQ TIS 1ST 20SQCM/<: CPT | Performed by: NURSE PRACTITIONER

## 2025-06-12 NOTE — PROGRESS NOTES
Chief Complaint   Patient presents with    Wound Recheck     Here for Left anterior leg wound recheck some increase pain noted this last week      HPI:   6/12/25: F/u LLE wound. Says the wound was pretty painful throughout the week. Pain is better today. She has been applying silver gel and vaseline gauze to the wound. Says the wound is looking smaller. Doing well otherwise, no change in health. Plans to transfer wound care to [facility], has not scheduled appointment yet.    6/5/25: F/u LLE wound. Thinks the dressing of vaseline gauze over the silver wound gel has helped to improve moisture in the wound. Gauze isn't sticking like it had been. Pain is on/off, increased after she was more active yesterday. Says she has been wearing double layer medigrip. Wants to switch to thigh high compression stocking she has at home.     5/29/25: F/u LLE wound. Says the wound gel and gauze was sticking to the wound with every dressing change. She was able to remove with applying saline. Says redness around wound has resolved, thinks it was the adhesive.     5/21/25: F/u visit LLE wound. Says she has been applying medi-honey and gauze to the wound for the past 2 weeks along with wearing a double layer of Medi- that she was given. She has been washing the wound in the shower and leaving open to air after showering. Says the wound has increased pain for at least an hour after showering.     5/8/25: 79 yr old female presents to wound clinic for evaluation of wound on her LLE. She is s/p ED&C of SCC of the left lower shin 3/6/25.  The excision site has been being treated for infection by dermatology. She was prescribed doxy, clinda, then cipro 500 mg BID (mid-April). Wound culture 4/3/25 grew Proteus mirabilis, E. Coli and staph species. No growth on wound cx 4/24/25. Pt reports the wound is no longer tender. She applies vaseline to the wound daily. She has a hx of venous insufficiency and wears compression stockings on and off. Other  medical problems include T2DM (A1c 7.0% 4/23/25), HTN, HLD, DVT on xarelto.     Medications - Current[1]    Allergies[2]     Problem List[3]    Past Medical History[4]      REVIEW OF SYSTEMS:   Review of Systems   Constitutional:  Negative for activity change, appetite change, chills, fatigue and fever.   Respiratory:  Negative for shortness of breath.    Cardiovascular:  Positive for leg swelling. Negative for chest pain.   Gastrointestinal:  Negative for abdominal pain.   Musculoskeletal:  Negative for gait problem.   Skin:  Positive for wound. Negative for color change and rash.      PHYSICAL EXAM:   /77   Pulse 78   Temp 97.7 °F (36.5 °C)   Resp 18   SpO2 95%    Estimated body mass index is 39.54 kg/m² as calculated from the following:    Height as of 3/22/22: 66\".    Weight as of 3/22/22: 245 lb.   Vital signs reviewed.Appears stated age, well groomed.    Physical Exam  Constitutional:       General: She is not in acute distress.     Appearance: Normal appearance. She is obese. She is not ill-appearing.   HENT:      Head: Normocephalic and atraumatic.   Cardiovascular:      Rate and Rhythm: Normal rate.      Pulses:           Dorsalis pedis pulses are 2+ on the left side.        Posterior tibial pulses are 2+ on the left side.   Pulmonary:      Effort: Pulmonary effort is normal.   Musculoskeletal:      Left lower leg: Edema present.   Feet:      Comments: 5/8/25: Left PT/DP pulses w/biphasic wave sounds using handheld doppler  Skin:     General: Skin is warm and dry.      Capillary Refill: Capillary refill takes 2 to 3 seconds.      Findings: Wound (LLE, see wound care flowsheet) present.   Neurological:      General: No focal deficit present.      Mental Status: She is alert and oriented to person, place, and time.   Psychiatric:         Mood and Affect: Mood normal.         Behavior: Behavior normal.          Wound 05/08/25 1 Leg Left;Anterior;Lower;Medial (Active)   Date First Assessed/Time First  Assessed: 05/08/25 1318   Present on Original Admission: Yes   Wound Number (Wound Clinic Only): 1  Primary Wound Type: Old surgical  Location: Leg  Wound Location Orientation: Left;Anterior;Lower;Medial      Assessments 5/8/2025  1:22 PM 6/12/2025 11:41 AM   Wound Image         Site Assessment Yellow;Red;Moist Yellow;Red;Moist   Closure Not approximated Not approximated   Drainage Amount Moderate Moderate   Drainage Description Serosanguineous Serosanguineous;Yellow   Treatments Cleansed;Saline;Vashe;Topical (Barrier/Moisturizer/Ointment);Wound ;Honey Gel (zinc oxide to periwound) Cleansed;Vashe;Topical (Barrier/Moisturizer/Ointment);Compression Sleeve   Dressing Gauze;Kerlix roll;Tape (kerramax) Other (Medihoney gel,Foam lite,Zinc oxide to periwound,)   Dressing Changed New Changed   Dressing Status Clean;Dry;Intact Clean;Dry;Intact   Wound Length (cm) 1.7 cm 0.9 cm   Wound Width (cm) 1.8 cm 1 cm   Wound Surface Area (cm^2) 2.4 cm^2 0.71 cm^2   Wound Depth (cm) 0.1 cm 0.1 cm   Wound Volume (cm^3) 0.16 cm^3 0.047 cm^3   Wound Healing % -- 71   Margins Well-defined edges;Attached edges Well-defined edges;Attached edges   Non-staged Wound Description Full thickness Full thickness   Ira-wound Assessment Dry;Intact;Pink Dry;Intact;Edema;Red   Wound Granulation Tissue Red Red   Wound Bed Granulation (%) 10 % 70 %   Wound Bed Epithelium (%) 0 % 0 %   Wound Bed Slough (%) 90 % 30 %   Wound Bed Eschar (%) 0 % 0 %   Wound Bed Fibrin (%) 0 % 0 %   State of Healing Non-healing Early/partial granulation   Wound Odor None None       Active Orders   Date Order Priority Status Authorizing Provider   06/12/25 1404 Debridement Routine Active Negra Genao APRN   05/08/25 1615 OP Wound Dressing Routine Active Negra Genao APRN     - Cleansing:    Cleanse with normal saline or wound cleanser     - Cleansing:    Cleanse with Vashe     - Dressing:    Honey gel     - Dressing:    Kerramax/Super absorbent     - Dressing:     Kerlix     - Dressing:    Medipore tape (no substitution)     - Dressing:    Dry gauze     - Dressing:    Sensi-care/zinc     - Frequency:    Change dressing three times per week (and PRN)       Inactive Orders   Date Order Priority Status Authorizing Provider   06/05/25 1326 Debridement Old surgical Left;Anterior;Lower;Medial Leg Routine Completed Negra Genao APRN   05/29/25 1428 Debridement Old surgical Left;Anterior;Lower;Medial Leg Routine Completed Negra Genao APRN   05/21/25 1340 Debridement Old surgical Left;Anterior;Lower;Medial Leg Routine Discontinued Negra Genao APRN   05/21/25 1146 Debridement Old surgical Left;Anterior;Lower;Medial Leg Routine Completed Negra Genao APRN   05/08/25 1358 Debridement Old surgical Left;Anterior;Lower;Medial Leg Routine Completed Negra Genao APRN       Compression Wrap 05/08/25 Leg Left (Active)   Placement Date/Time: 05/08/25 1342   Location: Leg  Wound Location Orientation: Left      Assessments 5/8/2025  1:22 PM 6/12/2025 11:41 AM   Response to Treatment Well tolerated Well tolerated   Compression Layers 2 2   Compression Product Type Tubigrip/Spanda (Size F x 2 medigrip) Tubigrip/Spanda (Size F medigrip over size E)   Dressing Applied Yes Yes   Compression Wrap Location Toes to Knee Toes to Knee   Compression Wrap Status Clean;Dry;Intact Clean;Dry;Intact       Active Orders   Date Order Priority Status Authorizing Provider   05/08/25 1615 OP Wound Dressing Routine Active Negra Genao APRN     - Cleansing:    Cleanse with normal saline or wound cleanser     - Cleansing:    Cleanse with Vashe     - Dressing:    Honey gel     - Dressing:    Kerramax/Super absorbent     - Dressing:    Kerlix     - Dressing:    Medipore tape (no substitution)     - Dressing:    Dry gauze     - Dressing:    Sensi-care/zinc     - Frequency:    Change dressing three times per week (and PRN)       Debridement Old surgical Left;Anterior;Lower;Medial Leg    Wound 05/08/25 1 Leg Left;Anterior;Lower;Medial    Performed by: Negra Genao APRN  Authorized by: Negra Genao APRN      Consent   Consent obtained? verbal  Consent given by: patient  Risks discussed? procedural risks discussed  Time out called at 6/12/2025 11:50 AM  Immediately prior to the procedure a time out was called and the performing provider verified the correct patient, procedure, equipment, support staff, and site/side marked as required.    Debridement Details  Performed by: NP  Debridement type: surgical  Level of debridement: subcutaneous tissue  Pain control: lidocaine 4%  Pain control administration type: topical    Pre-debridement measurements  Length (cm): 0.9  Width (cm): 1  Depth (cm): 0.1  Surface Area (cm^2): 0.71    Post-debridement measurements  Length (cm): 1  Width (cm): 1  Depth (cm): 0.1  Percent debrided: 100%  Surface Area (cm^2): 0.79  Area Debrided (cm^2): 0.79  Volume (cm^3): 0.05    Tissue and other material debrided: subcutaneous tissue  Devitalized tissue debrided: biofilm, fibrin and slough  Instrument(s) utilized: curette  Bleeding: small  Hemostasis obtained with: pressure  Procedural pain (0-10): 5  Post-procedural pain: 2   Response to treatment: procedure was tolerated well      ASSESSMENT AND PLAN:      1. Non-healing wound of lower extremity, left, sequela    2. Hx of squamous cell carcinoma excision    F/u visit for non-healing surgical wound on [facility], s/p ED&C for removal of SCC 3/2025. Wound was previously treated with 3 rounds of antibiotics for wound infection, pt reports only improved with cipro. Hindering factors for wound healing include HTN, venous insufficiency w/edema, T2DM.     Wound measurements continue to improve  Wound slightly dry w/adherent fibrotic slough with friable granulation  Periwound hyperpigmented, dry   Site debrided to subcutaneous level then cleansed w/Vashe prior to applying new dressing    Wound Care:  -Zinc oxide to  periwound  -Medi-honey and gauze dressing  -Change dressing daily and PRN  -Continue to avoid getting wound wet in shower, wear cast cover or can use waterproof dressing and change dressing right after showering  -Double layer Medi- E applied for edema management (has 20-30 mmHg stockings at home, okay to wear instead if prefers)    Continue the following:  -Nutrition for wound healing: increase protein intake, lower carb and sugar intake, need vit A, B's, C, D and zinc for wound healing along with healthy diet  -Minimize sodium intake and take medication for HTN as prescribed  -Elevate legs above heart level for 30 minutes 3x/day to reduce swelling  -Calf exercises throughout the day, stay active, but avoid standing for prolonged periods of time  -Monitor for s/sx of infection by checking skin daily, call with any concerns    Risks, benefits, and alternatives of current treatment plan discussed in detail.  Questions and concerns addressed. Red flags to RTC or ED reviewed.  Patient agrees to plan.      Plans to transfer wound care to [facility], referral was printed. Pt to schedule appointment in the next 1-2 weeks    I spent 30 minutes with the patient. This time included:  preparing to see the patient (eg, review notes and recent diagnostics), seeing the patient, performing a medically appropriate examination and/or evaluation, counseling and educating the patient, and documenting in the record.    NOTE TO PATIENT: The 21st Century Cures Act makes clinical notes like these available to patients in the interest of transparency. Clinical notes are medical documents used by physicians and care providers to communicate with each other. These documents include medical language and terminology, abbreviations, and treatment information that may sound technical and at times possibly unfamiliar. In addition, at times, the verbiage may appear blunt or direct. These documents are one tool providers use to communicate relevant  information and clinical opinions of the care providers in a way that allows common understanding of the clinical context.          [1]   Current Outpatient Medications:     lisinopril-hydroCHLOROthiazide 20-25 MG Oral Tab, Take 1 tablet by mouth daily., Disp: 90 tablet, Rfl: 0    XARELTO 20 MG Oral Tab, TAKE 1 TABLET(20 MG) BY MOUTH DAILY WITH FOOD, Disp: 90 tablet, Rfl: 1    ROSUVASTATIN 10 MG Oral Tab, TAKE 1 TABLET(10 MG) BY MOUTH EVERY NIGHT, Disp: 90 tablet, Rfl: 0    Misc. Devices (PULSE OXIMETER FOR FINGER) Does not apply Misc, 1 each every 4 (four) hours as needed., Disp: 1 each, Rfl: 0    GEMFIBROZIL 600 MG Oral Tab, TAKE 1 TABLET(600 MG) BY MOUTH TWICE DAILY BEFORE MEALS, Disp: 180 tablet, Rfl: 1    gabapentin 100 MG Oral Cap, TAKE 1 CAPSULE BY MOUTH EVERY MORNING AND 2 CAPSULES EVERY EVENING, Disp: 270 capsule, Rfl: 3    Glucose Blood (ACCU-CHEK GUIDE) In Vitro Strip, 1 strip by In Vitro route daily., Disp: 100 strip, Rfl: 3    Accu-Chek Softclix Lancets Does not apply Misc, Check blood glucose levels daily, Disp: 100 each, Rfl: 3    Vitamin D3, Cholecalciferol, 25 MCG (1000 UT) Oral Tab, Take 2,000 Units by mouth daily., Disp: , Rfl:     Budesonide-Formoterol Fumarate (SYMBICORT) 160-4.5 MCG/ACT Inhalation Aerosol, Inhale 2 puffs into the lungs 2 (two) times daily., Disp: 3 Inhaler, Rfl: 3    Albuterol Sulfate  (90 Base) MCG/ACT Inhalation Aero Soln, Inhale 2 puffs into the lungs every 4 (four) hours as needed., Disp: 1 Inhaler, Rfl: 3    Folic Acid-Vit B6-Vit B12 (FOLBEE) 2.5-25-1 MG Oral Tab, Take 1 tablet by mouth daily., Disp: 90 tablet, Rfl: 3    metRONIDAZOLE 0.75 % External Cream, Apply to AA on face daily, Disp: 45 g, Rfl: 2    Fluticasone Propionate 50 MCG/ACT Nasal Suspension, 2 sprays by Each Nare route daily. (Patient taking differently: 2 sprays by Each Nare route daily as needed.  ), Disp: 3 Bottle, Rfl: 3    Multiple Vitamins-Minerals (MULTIVITAL) Oral Tab, Take 1 tablet by mouth  daily., Disp: , Rfl:     Psyllium 48.57 % Oral Powder, Take by mouth. 2 TBS daily , Disp: , Rfl:     fexofenadine 180 MG Oral Tab, Take 180 mg by mouth daily as needed.  , Disp: , Rfl:   [2]   Allergies  Allergen Reactions    Other HIVES     Atkins brand probiotics  Itching with Dial Soap (Bar soap)     Shellfish HIVES    Aspirin OTHER (SEE COMMENTS)     Stomach pain-the 325mg    Cardizem [Diltiazem Hcl]      Doesn't remember    Ceftin      Doesn't remember    Guaifed [Pseudoephedrine-Guaifenesin]      Doesn't remember    Adhesive Tape OTHER (SEE COMMENTS)     Some bandaids only redness   [3]   Patient Active Problem List  Diagnosis    HTN (hypertension)    RENATE on CPAP    Toxoplasmosis chorioretinitis of right eye    Morbid obesity ([facility])    Combined forms of age-related cataract of both eyes    PVD (posterior vitreous detachment)    Ocular migraine    BPPV (benign paroxysmal positional vertigo), right    Pure hypercholesterolemia    Varicose veins of both legs with edema    Edema of both lower extremities due to peripheral venous insufficiency    Thrombocytosis    Aortic atherosclerosis    Chronic diastolic CHF (congestive heart failure) ([facility])    Abnormal EEG    Jerking movements of extremities    Chronic obstructive pulmonary disease, unspecified COPD type ([facility])    BMI 40.0-44.9, adult ([facility])    Cerebellar atrophy    Stage 3a chronic kidney disease ([facility])    Chronic deep vein thrombosis (DVT) of femoral vein of left lower extremity ([facility])    Chronic bronchitis ([facility])    Other pulmonary embolism without acute cor pulmonale, unspecified chronicity ([facility])    Mild emphysema ([facility])    Type 2 diabetes mellitus with cataract ([facility])    Type 2 diabetes mellitus with stage 3 chronic kidney disease, without long-term current use of insulin ([facility])    Chronic respiratory failure with hypoxia, on home oxygen therapy ([facility])   [4]   Past Medical History:   CKD (chronic kidney disease)    COPD    mild, no O2 at this time    Deep vein  thrombosis ([facility])    left after knee scope - treated and resolved    DM2 (diabetes mellitus, type 2) ([facility])    DM2 (diabetes mellitus, type 2) ([facility])    Glucose intolerance (pre-diabetes)    Hypercholesterolemia    Hypertension    MENOPAUSE    OBESITY    Osteoarthritis    SEASONAL ALLERGIES    Skin cancer of face    basal cell    Sleep apnea    AHI 4/ RDI 29/ SaO2 84%/ CPAP 8 with oxygen at 2 l/min entrained/ [facility]    Superficial thrombophlebitis    left thigh    Tendonitis of wrist, left    Transient ischemic attack (TIA)    possible TIA    Venous insufficiency

## 2025-06-12 NOTE — PROGRESS NOTES
Weekly Wound Education Note    Teaching Provided To: Patient  Training Topics: Cleasing and general instructions, Dressing, Skin care, Signs and symptoms of infection  Training Method: Demonstration, Explain/Verbal  Training Response: Patient responds and understands        Notes: LLE: medihoney gel, Foam lite, Medigrip E x 2

## (undated) DEVICE — 3M™ RED DOT™ MONITORING ELECTRODE WITH FOAM TAPE AND STICKY GEL, 50/BAG, 20/CASE, 72/PLT 2570: Brand: RED DOT™

## (undated) DEVICE — FILTERLINE NASAL ADULT O2/CO2

## (undated) DEVICE — 1200CC GUARDIAN II: Brand: GUARDIAN

## (undated) DEVICE — MEDI-VAC NON-CONDUCTIVE SUCTION TUBING: Brand: CARDINAL HEALTH

## (undated) DEVICE — FORCEP RADIAL JAW 4

## (undated) DEVICE — MEDI-VAC SUCTION HANDLE REGULAR CAPACITY: Brand: CARDINAL HEALTH

## (undated) DEVICE — Device: Brand: DEFENDO AIR/WATER/SUCTION AND BIOPSY VALVE

## (undated) DEVICE — ENDOSCOPY PACK - LOWER: Brand: MEDLINE INDUSTRIES, INC.

## (undated) NOTE — LETTER
[facility] [address], [address]  Consent for Procedure/Sedation    Date:        Time:       1.  I authorize the performance upon Emily Hernandez the following: Peripheral angiography, atherectomy, percutaneous transluminal an Relationship to  to consent for patient: ___________________________   patient: ___________________    Witness: ______________________________________  Date: _____________________    Patient Name: Addis Brennan     : 1945                 Prin

## (undated) NOTE — IP AVS SNAPSHOT
[facility] [place] [address] [place], [address] ~ [phone]                Discharge Summary   6/7/2017    [address] [facility]           Admission Information        Provider Department    6/7/2017 Janette Thomas MD  Endoscopy Commonly known as:  [facility]        Place 1 Application onto the skin 2 (two) times daily as needed.     Thien Hathaway     [    ]    [    ]    [    ]    [    ]       Enalapril Maleate 20 MG Tabs   Commonly known as:  VASOTEC        TAKE 1/2 TABLET TWICE - Do not drink alcohol today. Medication:  - If you have questions about resuming your normal medications, please contact your Primary Care Physician. Activities:  - Take it easy today. Do not return to work today. - Do not drive today.   - Do not op Additional Information       We are concerned for your overall well being:    - If you are a smoker or have smoked in the last 12 months, we encourage you to explore options for quitting.     - If you have concerns related to behavioral health issues

## (undated) NOTE — LETTER
6/12/2017          [address] [place] [address] [phone] [id number]    Dear Sebastián Zhu,       Here are the  biopsy/pathology findings from your recent Colonoscopy :    an adenomatous polyp(s), which is a benign potentially pre-cancerous growth